# Patient Record
Sex: MALE | Race: WHITE
[De-identification: names, ages, dates, MRNs, and addresses within clinical notes are randomized per-mention and may not be internally consistent; named-entity substitution may affect disease eponyms.]

---

## 2017-01-01 ENCOUNTER — HOSPITAL ENCOUNTER (EMERGENCY)
Dept: HOSPITAL 17 - PHED | Age: 47
Discharge: HOME | End: 2017-01-01
Payer: COMMERCIAL

## 2017-01-01 VITALS — WEIGHT: 229.28 LBS | HEIGHT: 73 IN | BODY MASS INDEX: 30.39 KG/M2

## 2017-01-01 VITALS
RESPIRATION RATE: 18 BRPM | TEMPERATURE: 97.9 F | SYSTOLIC BLOOD PRESSURE: 119 MMHG | OXYGEN SATURATION: 96 % | HEART RATE: 112 BPM | DIASTOLIC BLOOD PRESSURE: 79 MMHG

## 2017-01-01 VITALS — OXYGEN SATURATION: 94 %

## 2017-01-01 VITALS
DIASTOLIC BLOOD PRESSURE: 86 MMHG | RESPIRATION RATE: 22 BRPM | OXYGEN SATURATION: 91 % | SYSTOLIC BLOOD PRESSURE: 119 MMHG | HEART RATE: 112 BPM | TEMPERATURE: 97.7 F

## 2017-01-01 VITALS — OXYGEN SATURATION: 92 %

## 2017-01-01 DIAGNOSIS — R00.0: ICD-10-CM

## 2017-01-01 DIAGNOSIS — F17.210: ICD-10-CM

## 2017-01-01 DIAGNOSIS — Z86.711: ICD-10-CM

## 2017-01-01 DIAGNOSIS — J44.1: Primary | ICD-10-CM

## 2017-01-01 LAB
ANION GAP SERPL CALC-SCNC: 10 MEQ/L (ref 5–15)
BASOPHILS # BLD AUTO: 0.6 TH/MM3 (ref 0–0.2)
BASOPHILS NFR BLD: 4.1 % (ref 0–2)
BUN SERPL-MCNC: 16 MG/DL (ref 7–18)
CHLORIDE SERPL-SCNC: 108 MEQ/L (ref 98–107)
EOSINOPHIL # BLD: 0.9 TH/MM3 (ref 0–0.4)
EOSINOPHIL NFR BLD: 6.2 % (ref 0–4)
ERYTHROCYTE [DISTWIDTH] IN BLOOD BY AUTOMATED COUNT: 15.2 % (ref 11.6–17.2)
GFR SERPLBLD BASED ON 1.73 SQ M-ARVRAT: 91 ML/MIN (ref 89–?)
HCO3 BLD-SCNC: 25 MEQ/L (ref 21–32)
HCT VFR BLD CALC: 42.6 % (ref 39–51)
HEMO FLAGS: (no result)
LYMPHOCYTES # BLD AUTO: 2.6 TH/MM3 (ref 1–4.8)
LYMPHOCYTES NFR BLD AUTO: 17.3 % (ref 9–44)
MCH RBC QN AUTO: 29.3 PG (ref 27–34)
MCHC RBC AUTO-ENTMCNC: 33.3 % (ref 32–36)
MCV RBC AUTO: 87.8 FL (ref 80–100)
MONOCYTES NFR BLD: 6.9 % (ref 0–8)
NEUTROPHILS # BLD AUTO: 10.1 TH/MM3 (ref 1.8–7.7)
NEUTROPHILS NFR BLD AUTO: 65.5 % (ref 16–70)
PLATELET # BLD: 450 TH/MM3 (ref 150–450)
POTASSIUM SERPL-SCNC: 4 MEQ/L (ref 3.5–5.1)
RBC # BLD AUTO: 4.85 MIL/MM3 (ref 4.5–5.9)
SODIUM SERPL-SCNC: 143 MEQ/L (ref 136–145)
WBC # BLD AUTO: 15.3 TH/MM3 (ref 4–11)

## 2017-01-01 PROCEDURE — 94664 DEMO&/EVAL PT USE INHALER: CPT

## 2017-01-01 PROCEDURE — 94640 AIRWAY INHALATION TREATMENT: CPT

## 2017-01-01 PROCEDURE — 71010: CPT

## 2017-01-01 PROCEDURE — 96374 THER/PROPH/DIAG INJ IV PUSH: CPT

## 2017-01-01 PROCEDURE — 99285 EMERGENCY DEPT VISIT HI MDM: CPT

## 2017-01-01 PROCEDURE — 84484 ASSAY OF TROPONIN QUANT: CPT

## 2017-01-01 PROCEDURE — 85025 COMPLETE CBC W/AUTO DIFF WBC: CPT

## 2017-01-01 PROCEDURE — 87804 INFLUENZA ASSAY W/OPTIC: CPT

## 2017-01-01 PROCEDURE — 80048 BASIC METABOLIC PNL TOTAL CA: CPT

## 2017-01-01 PROCEDURE — 93005 ELECTROCARDIOGRAM TRACING: CPT

## 2017-01-01 RX ADMIN — IPRATROPIUM BROMIDE AND ALBUTEROL SULFATE SCH AMPULE: .5; 3 SOLUTION RESPIRATORY (INHALATION) at 09:13

## 2017-01-01 NOTE — PD
HPI


Chief Complaint:  Respiratory Distress


Time Seen by Provider:  08:53


Travel History


International Travel<30 days:  No


Contact w/Intl Traveler<30days:  No


Traveled to known affect area:  No





History of Present Illness


HPI


This 46-year-old male is complaining of shortness of breath.  He says he been 

short of breath for the past week and is getting progressively worse.  He's 

been coughing up some yellow brownish phlegm.  He has not had fever.  He has a 

history of COPD.  He says he has not had a cigarette for the past week.  He has 

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  He has been on albuterol and Atrovent 

recently.  He has been on steroids in the past but is not on them currently.  

He has his first appointment with a pulmonologist on Friday PFSH


Past Medical History


Hx Anticoagulant Therapy:  Yes


Anxiety:  Yes


Heart Rhythm Problems:  Yes


Cancer:  No


Cardiovascular Problems:  Yes (CHRONIC TACHYCARDIA)


Congestive Heart Failure:  No


COPD:  Yes


Coronary Artery Disease:  No


Diabetes:  No


Diminished Hearing:  No


Diverticulitis:  Yes


Deep Vein Thrombosis:  Yes (PE)


Endocrine:  No


Gastrointestinal Disorders:  Yes


GERD:  Yes


Genitourinary:  No


Implanted Vascular Access Dvce:  Yes (FILTER)


Musculoskeletal:  No


Neurologic:  No


Psychiatric:  No


Respiratory:  Yes





Past Surgical History


Abdominal Surgery:  Yes (LIVER BIOPSY, COLOSTOMY)


Body Medical Devices:  UZIEL FILTER


Other Surgery:  Yes





Social History


Alcohol Use:  Yes (2-3 BEERS NIGHTLY)


Tobacco Use:  Yes (1/2PPD)


Substance Use:  No





Allergies-Medications


(Allergen,Severity, Reaction):  


Coded Allergies:  


     No Known Allergies (Unverified , 12/22/16)


Reported Meds & Prescriptions





Reported Meds & Active Scripts


Active


Albuterol Neb (Albuterol Sulfate) 2.5 Mg/3 Ml Neb 2.5 Mg NEB Q4HR NEB


     While awake


Reported


Advair Diskus Inh (Fluticasone-Salmeterol Inh) 100-50 Mcg/Blist Aer 1 Puff INH 

BID


     Rinse mouth after use.








Review of Systems


General / Constitutional:  No: Fever, Chills


Eyes:  No: Diploplia, Blurred Vision


HENT:  No: Headaches, Vertigo


Cardiovascular:  No: Chest Pain or Discomfort


Respiratory:  Positive: Cough, Shortness of Breath, Wheezing,  No: Hemoptysis


Gastrointestinal:  No: Vomiting, Diarrhea


Genitourinary:  No: Urgency, Frequency


Musculoskeletal:  No: Myalgias, Arthralgias


Skin:  No Rash, No Itching


Hematologic/Lymphatic:  No: Easy Bruising





Physical Exam


Narrative


GENERAL: Well-developed male.  He is dyspneic on arrival


SKIN: Warm and dry.


HEAD: Atraumatic. Normocephalic. 


EYES: Pupils equal and round. No scleral icterus. No injection or drainage. 


ENT: No nasal bleeding or discharge.  Mucous membranes pink and moist.


NECK: Trachea midline. No JVD. 


CARDIOVASCULAR: Regular rate and rhythm.  No murmur appreciated.


RESPIRATORY:  accessory muscle use.  Bilateral expiratory wheezes are present. 

Breath sounds equal bilaterally. 


GASTROINTESTINAL: Abdomen soft, non-tender, nondistended. Hepatic and splenic 

margins not palpable. 


MUSCULOSKELETAL: No obvious deformities. No clubbing.  No cyanosis.  No edema. 


NEUROLOGICAL: Awake and alert. No obvious cranial nerve deficits.  Motor 

grossly within normal limits. Normal speech.


PSYCHIATRIC: Appropriate mood and affect; insight and judgment normal.





Data


Data


Last Documented VS





Vital Signs








  Date Time  Temp Pulse Resp B/P Pulse Ox O2 Delivery O2 Flow Rate FiO2


 


1/1/17 09:45 97.9 112 18 119/79 96 Nasal Cannula 2 








Orders





 Complete Blood Count With Diff (1/1/17 08:53)


Basic Metabolic Panel (Bmp) (1/1/17 08:53)


Troponin I (1/1/17 08:53)


Influenzae A/B Antigen (1/1/17 08:53)


Iv Access Insert/Monitor (1/1/17 08:53)


Electrocardiogram (1/1/17 08:53)


Ecg Monitoring (1/1/17 08:53)


Oximetry (1/1/17 08:53)


Oxygen Administration (1/1/17 08:53)


Chest, Single Ap (1/1/17 08:53)


Sodium Chloride 0.9% Flush (Ns Flush) (1/1/17 09:00)


Methylprednisolone So Succ Inj (Solumedr (1/1/17 09:00)


Albuterol-Ipratropium Neb (Duoneb Neb) (1/1/17 09:00)





Labs








 Laboratory Tests








Test 1/1/17





 09:03


 


White Blood Count 15.3 TH/MM3


 


Red Blood Count 4.85 MIL/MM3


 


Hemoglobin 14.2 GM/DL


 


Hematocrit 42.6 %


 


Mean Corpuscular Volume 87.8 FL


 


Mean Corpuscular Hemoglobin 29.3 PG


 


Mean Corpuscular Hemoglobin 33.3 %





Concent 


 


Red Cell Distribution Width 15.2 %


 


Platelet Count 450 TH/MM3


 


Mean Platelet Volume 7.3 FL


 


Neutrophils (%) (Auto) 65.5 %


 


Lymphocytes (%) (Auto) 17.3 %


 


Monocytes (%) (Auto) 6.9 %


 


Eosinophils (%) (Auto) 6.2 %


 


Basophils (%) (Auto) 4.1 %


 


Neutrophils # (Auto) 10.1 TH/MM3


 


Lymphocytes # (Auto) 2.6 TH/MM3


 


Monocytes # (Auto) 1.1 TH/MM3


 


Eosinophils # (Auto) 0.9 TH/MM3


 


Basophils # (Auto) 0.6 TH/MM3


 


CBC Comment DIFF FINAL 


 


Differential Comment  


 


Sodium Level 143 MEQ/L


 


Potassium Level 4.0 MEQ/L


 


Chloride Level 108 MEQ/L


 


Carbon Dioxide Level 25.0 MEQ/L


 


Anion Gap 10 MEQ/L


 


Blood Urea Nitrogen 16 MG/DL


 


Creatinine 0.89 MG/DL


 


Estimat Glomerular Filtration 91 ML/MIN





Rate 


 


Random Glucose 87 MG/DL


 


Calcium Level 8.6 MG/DL


 


Troponin I LESS THAN 0.02





 NG/ML














[facility] [place]


Medical Decision Making


Medical Screen Exam Complete:  Yes


Emergency Medical Condition:  Yes


Medical Record Reviewed:  Yes


Differential Diagnosis


Differential includes pneumonia, COPD exacerbation, URI


Narrative Course


Chest x-rays been read as negative.  He has been given Solu-Medrol and repeated 

nebulizer treatments.  He reports feeling better.  Repeat examination shows 

improvement in his wheezing.  He appears stable for discharge.  He'll be 

released with prescriptions for prednisone, albuterol and amoxicillin





Diagnosis





 Primary Impression:  


 COPD with acute exacerbation


Scripts


Albuterol Neb 2.5 Mg/3 Ml Neb2.5 Mg NEB Q4HR NEB PRN (SHORTNESS OF BREATH) #60 

NEBULE  Ref 0


   Prov:Ryley Barcenas MD         1/1/17 


Amoxicillin 500 Mg Cjd406 Mg PO TID  #21 TAB  Ref 0


   Prov:Ryley Barcenas MD         1/1/17 


Prednisone 20 Mg Tab60 Mg PO DAILY  7 Days  Ref 0


   40 MG twice a day x 3 days, then 20 MG daily x 3 days, then 10 MG


   daily x 3 days


   Prov:Ryley Barcenas MD         1/1/17


Disposition:  01 DISCHARGE HOME


Condition:  Stable








Ryley Barcenas MD Jan 1, 2017 09:14

## 2017-01-01 NOTE — RADHPO
EXAM DATE/TIME:  01/01/2017 09:37 

 

[facility] COMPARISON:     

CHEST SINGLE AP, November 24, 2016, 12:15.

 

                     

INDICATIONS :     

Short of breath.

                     

 

MEDICAL HISTORY :     

Chronic obstructive pulmonary disease.  Deep venous thrombosis.        

 

SURGICAL HISTORY :     

IVC filter placement.   

 

ENCOUNTER:     

Subsequent                                        

 

ACUITY:     

1 day      

 

PAIN SCORE:     

1/10

 

LOCATION:     

Bilateral chest 

 

FINDINGS:     

A single view of the chest demonstrates the lungs to be symmetrically aerated without evidence of mas
s, infiltrate or effusion.  The cardiomediastinal contours are unremarkable.  Osseous structures are 
intact.

 

CONCLUSION:     No acute disease.  

 

 

 

 Cristo Alcazar MD on January 01, 2017 at 10:02           

Board Certified Radiologist.

 This report was verified electronically.

## 2017-01-02 NOTE — EKG
Date Performed: 01/01/2017       Time Performed: 08:47:00

 

PTAGE:      46 years

 

EKG:      Sinus tachycardia Septal T wave changes are nonspecific Since previous tracing, no signific
ant change noted Borderline ECG

 

PREVIOUS TRACING       : 12/08/2016 22.37

 

DOCTOR:   Gladis Gonzales  Interpretating Date/Time  01/02/2017 18:33:32

## 2017-02-27 ENCOUNTER — HOSPITAL ENCOUNTER (INPATIENT)
Dept: HOSPITAL 17 - PHED | Age: 47
LOS: 1 days | Discharge: HOME | DRG: 192 | End: 2017-02-28
Attending: HOSPITALIST | Admitting: HOSPITALIST
Payer: COMMERCIAL

## 2017-02-27 VITALS — BODY MASS INDEX: 31.23 KG/M2 | HEIGHT: 73 IN | WEIGHT: 235.67 LBS

## 2017-02-27 VITALS
RESPIRATION RATE: 20 BRPM | HEART RATE: 121 BPM | DIASTOLIC BLOOD PRESSURE: 79 MMHG | SYSTOLIC BLOOD PRESSURE: 115 MMHG | TEMPERATURE: 97.9 F | OXYGEN SATURATION: 92 %

## 2017-02-27 VITALS
DIASTOLIC BLOOD PRESSURE: 85 MMHG | OXYGEN SATURATION: 98 % | RESPIRATION RATE: 16 BRPM | HEART RATE: 114 BPM | TEMPERATURE: 97.7 F | SYSTOLIC BLOOD PRESSURE: 131 MMHG

## 2017-02-27 VITALS
HEART RATE: 122 BPM | RESPIRATION RATE: 26 BRPM | OXYGEN SATURATION: 88 % | TEMPERATURE: 97.7 F | DIASTOLIC BLOOD PRESSURE: 89 MMHG | SYSTOLIC BLOOD PRESSURE: 142 MMHG

## 2017-02-27 VITALS
HEART RATE: 106 BPM | RESPIRATION RATE: 18 BRPM | OXYGEN SATURATION: 99 % | TEMPERATURE: 97.7 F | DIASTOLIC BLOOD PRESSURE: 90 MMHG | SYSTOLIC BLOOD PRESSURE: 141 MMHG

## 2017-02-27 VITALS
HEART RATE: 77 BPM | SYSTOLIC BLOOD PRESSURE: 129 MMHG | DIASTOLIC BLOOD PRESSURE: 75 MMHG | RESPIRATION RATE: 19 BRPM | OXYGEN SATURATION: 95 % | TEMPERATURE: 97.9 F

## 2017-02-27 VITALS — OXYGEN SATURATION: 96 %

## 2017-02-27 VITALS
OXYGEN SATURATION: 98 % | SYSTOLIC BLOOD PRESSURE: 131 MMHG | TEMPERATURE: 97.7 F | HEART RATE: 114 BPM | DIASTOLIC BLOOD PRESSURE: 85 MMHG | RESPIRATION RATE: 16 BRPM

## 2017-02-27 VITALS — SYSTOLIC BLOOD PRESSURE: 131 MMHG | DIASTOLIC BLOOD PRESSURE: 74 MMHG

## 2017-02-27 VITALS — OXYGEN SATURATION: 95 %

## 2017-02-27 DIAGNOSIS — F41.9: ICD-10-CM

## 2017-02-27 DIAGNOSIS — F17.210: ICD-10-CM

## 2017-02-27 DIAGNOSIS — J44.1: Primary | ICD-10-CM

## 2017-02-27 LAB
ALP SERPL-CCNC: 112 U/L (ref 45–117)
ALT SERPL-CCNC: 143 U/L (ref 12–78)
ANION GAP SERPL CALC-SCNC: 6 MEQ/L (ref 5–15)
APTT BLD: 31 SEC (ref 24.3–30.1)
AST SERPL-CCNC: 90 U/L (ref 15–37)
BASE EXCESS BLD CALC-SCNC: 0.3 MMOL/L (ref -2–2)
BASOPHILS # BLD AUTO: 0.1 TH/MM3 (ref 0–0.2)
BASOPHILS NFR BLD: 0.8 % (ref 0–2)
BENZODIAZEPINES PNL UR: 88 % (ref 90–100)
BILIRUB SERPL-MCNC: 0.4 MG/DL (ref 0.2–1)
BLOOD GAS CARBOXYHEMOGLOBIN: 1.9 % (ref 0–4)
BLOOD GAS HCO3: 24 MMOL/L (ref 22–26)
BLOOD GAS OXYGEN CONTENT: 18.6 VOL % (ref 12–20)
BLOOD GAS PCO2: 38 MMHG (ref 38–42)
BUN SERPL-MCNC: 18 MG/DL (ref 7–18)
CHLORIDE SERPL-SCNC: 108 MEQ/L (ref 98–107)
CK MB SERPL-MCNC: 1.1 NG/ML (ref 0.5–3.6)
CK SERPL-CCNC: 111 U/L (ref 39–308)
CRITICAL VALUE: YES
DRAW SITE: (no result)
EOSINOPHIL # BLD: 0.8 TH/MM3 (ref 0–0.4)
EOSINOPHIL NFR BLD: 6.5 % (ref 0–4)
ERYTHROCYTE [DISTWIDTH] IN BLOOD BY AUTOMATED COUNT: 13.4 % (ref 11.6–17.2)
GFR SERPLBLD BASED ON 1.73 SQ M-ARVRAT: 108 ML/MIN (ref 89–?)
HCO3 BLD-SCNC: 26.7 MEQ/L (ref 21–32)
HCT VFR BLD CALC: 46 % (ref 39–51)
HEMO FLAGS: (no result)
INR PPP: 1 RATIO
LYMPHOCYTES # BLD AUTO: 2.1 TH/MM3 (ref 1–4.8)
LYMPHOCYTES NFR BLD AUTO: 16.9 % (ref 9–44)
MCH RBC QN AUTO: 28.5 PG (ref 27–34)
MCHC RBC AUTO-ENTMCNC: 32.5 % (ref 32–36)
MCV RBC AUTO: 87.7 FL (ref 80–100)
METHGB MFR BLDA: 1.1 % (ref 0–2)
MONOCYTES NFR BLD: 9.5 % (ref 0–8)
NEUTROPHILS # BLD AUTO: 8.2 TH/MM3 (ref 1.8–7.7)
NEUTROPHILS NFR BLD AUTO: 66.3 % (ref 16–70)
NUMBER OF ARTERIAL PUNCTURES: 2
O2/TOTAL GAS SETTING VFR VENT: 21 %
OXYGEN DEVICE: (no result)
PLATELET # BLD: 498 TH/MM3 (ref 150–450)
PO2 BLD: 59 MMHG (ref 61–120)
POTASSIUM SERPL-SCNC: 5 MEQ/L (ref 3.5–5.1)
PROTHROMBIN TIME: 10.7 SEC (ref 9.8–11.6)
RBC # BLD AUTO: 5.24 MIL/MM3 (ref 4.5–5.9)
SALICYLATES SERPL-MCNC: 15.1 G/DL (ref 12–16)
SODIUM SERPL-SCNC: 141 MEQ/L (ref 136–145)
STAT: YES
TEMP CORR TO: 98.6
WBC # BLD AUTO: 12.4 TH/MM3 (ref 4–11)

## 2017-02-27 PROCEDURE — 82552 ASSAY OF CPK IN BLOOD: CPT

## 2017-02-27 PROCEDURE — 94640 AIRWAY INHALATION TREATMENT: CPT

## 2017-02-27 PROCEDURE — 87804 INFLUENZA ASSAY W/OPTIC: CPT

## 2017-02-27 PROCEDURE — 36600 WITHDRAWAL OF ARTERIAL BLOOD: CPT

## 2017-02-27 PROCEDURE — 80053 COMPREHEN METABOLIC PANEL: CPT

## 2017-02-27 PROCEDURE — 96374 THER/PROPH/DIAG INJ IV PUSH: CPT

## 2017-02-27 PROCEDURE — 71010: CPT

## 2017-02-27 PROCEDURE — 82805 BLOOD GASES W/O2 SATURATION: CPT

## 2017-02-27 PROCEDURE — 85610 PROTHROMBIN TIME: CPT

## 2017-02-27 PROCEDURE — 94664 DEMO&/EVAL PT USE INHALER: CPT

## 2017-02-27 PROCEDURE — 85730 THROMBOPLASTIN TIME PARTIAL: CPT

## 2017-02-27 PROCEDURE — 83605 ASSAY OF LACTIC ACID: CPT

## 2017-02-27 PROCEDURE — 87040 BLOOD CULTURE FOR BACTERIA: CPT

## 2017-02-27 PROCEDURE — 84484 ASSAY OF TROPONIN QUANT: CPT

## 2017-02-27 PROCEDURE — 82550 ASSAY OF CK (CPK): CPT

## 2017-02-27 PROCEDURE — 85025 COMPLETE CBC W/AUTO DIFF WBC: CPT

## 2017-02-27 PROCEDURE — 93005 ELECTROCARDIOGRAM TRACING: CPT

## 2017-02-27 PROCEDURE — 96361 HYDRATE IV INFUSION ADD-ON: CPT

## 2017-02-27 RX ADMIN — IPRATROPIUM BROMIDE AND ALBUTEROL SULFATE SCH AMPULE: .5; 3 SOLUTION RESPIRATORY (INHALATION) at 19:46

## 2017-02-27 RX ADMIN — SODIUM CHLORIDE, PRESERVATIVE FREE SCH ML: 5 INJECTION INTRAVENOUS at 21:00

## 2017-02-27 RX ADMIN — IPRATROPIUM BROMIDE AND ALBUTEROL SULFATE SCH AMPULE: .5; 3 SOLUTION RESPIRATORY (INHALATION) at 13:30

## 2017-02-27 RX ADMIN — TIOTROPIUM BROMIDE SCH MCG: 18 CAPSULE ORAL; RESPIRATORY (INHALATION) at 17:16

## 2017-02-27 RX ADMIN — IPRATROPIUM BROMIDE AND ALBUTEROL SULFATE SCH AMPULE: .5; 3 SOLUTION RESPIRATORY (INHALATION) at 06:47

## 2017-02-27 NOTE — PD
Data


Data


Last Documented VS





Vital Signs








  Date Time  Temp Pulse Resp B/P Pulse Ox O2 Delivery O2 Flow Rate FiO2


 


2/27/17 07:31     95 Nasal Cannula 2 


 


2/27/17 07:08 97.7 114 16 131/85    








Orders





 Electrocardiogram (2/27/17 06:37)


Complete Blood Count With Diff (2/27/17 06:37)


Comprehensive Metabolic Panel (2/27/17 06:37)


Prothrombin Time / Inr (Pt) (2/27/17 06:37)


Act Partial Throm Time (Ptt) (2/27/17 06:37)


Lactic Acid Sepsis Protocol (2/27/17 06:37)


Ckmb (Isoenzyme) Profile (2/27/17 06:37)


Troponin I (2/27/17 06:37)


Blood Culture (2/27/17 06:37)


Chest, Single Ap (2/27/17 06:37)


Arterial Blood Gas (Abg) (2/27/17 06:37)


Blood Glucose (2/27/17 06:37)


Ecg Monitoring (2/27/17 06:37)


Iv Access Insert/Monitor (2/27/17 06:37)


Oximetry (2/27/17 06:37)


Oxygen Administration (2/27/17 06:37)


Methylprednisolone So Succ Inj (Solumedr (2/27/17 06:45)


Albuterol-Ipratropium Neb (Duoneb Neb) (2/27/17 06:45)


Influenzae A/B Antigen (2/27/17 06:37)


Sodium Chlor 0.9% 1000 Ml Inj (Ns 1000 M (2/27/17 06:45)


CKMB (2/27/17 07:00)


CKMB% (2/27/17 07:00)





Labs





 Laboratory Tests








Test 2/27/17 2/27/17 2/27/17





 06:55 06:59 07:00


 


Blood Gas Puncture Site RT BRACHIAL   


 


Blood Gas Patient Temperature 98.6   


 


Blood Gas HCO3 24 mmol/L  


 


Blood Gas Base Excess 0.3 mmol/L  


 


Blood Gas Oxygen Saturation 88 %  


 


Arterial Blood pH 7.42   


 


Arterial Blood Partial 38 mmHG  





Pressure CO2   


 


Arterial Blood Partial 59 mmHG  





Pressure O2   


 


Arterial Blood Oxygen Content 18.6 Vol %  


 


Arterial Blood 1.9 %  





Carboxyhemoglobin   


 


Arterial Blood Methemoglobin 1.1 %  


 


Blood Gas Hemoglobin 15.1 G/DL  


 


Oxygen Delivery Device ROOM AIR   


 


Blood Gas Inspired Oxygen 21 %  


 


Lactic Acid Level  1.3 mmol/L 


 


White Blood Count   12.4 TH/MM3


 


Red Blood Count   5.24 MIL/MM3


 


Hemoglobin   15.0 GM/DL


 


Hematocrit   46.0 %


 


Mean Corpuscular Volume   87.7 FL


 


Mean Corpuscular Hemoglobin   28.5 PG


 


Mean Corpuscular Hemoglobin   32.5 %





Concent   


 


Red Cell Distribution Width   13.4 %


 


Platelet Count   498 TH/MM3


 


Mean Platelet Volume   7.0 FL


 


Neutrophils (%) (Auto)   66.3 %


 


Lymphocytes (%) (Auto)   16.9 %


 


Monocytes (%) (Auto)   9.5 %


 


Eosinophils (%) (Auto)   6.5 %


 


Basophils (%) (Auto)   0.8 %


 


Neutrophils # (Auto)   8.2 TH/MM3


 


Lymphocytes # (Auto)   2.1 TH/MM3


 


Monocytes # (Auto)   1.2 TH/MM3


 


Eosinophils # (Auto)   0.8 TH/MM3


 


Basophils # (Auto)   0.1 TH/MM3


 


CBC Comment   DIFF FINAL 


 


Differential Comment    


 


Prothrombin Time   10.7 SEC


 


Prothromb Time International   1.0 RATIO





Ratio   


 


Activated Partial   31.0 SEC





Thromboplast Time   


 


Sodium Level   141 MEQ/L


 


Potassium Level   5.0 MEQ/L


 


Chloride Level   108 MEQ/L


 


Carbon Dioxide Level   26.7 MEQ/L


 


Anion Gap   6 MEQ/L


 


Blood Urea Nitrogen   18 MG/DL


 


Creatinine   0.77 MG/DL


 


Estimat Glomerular Filtration   108 ML/MIN





Rate   


 


Random Glucose   102 MG/DL


 


Calcium Level   8.4 MG/DL


 


Total Bilirubin   0.4 MG/DL


 


Aspartate Amino Transf   90 U/L





(AST/SGOT)   


 


Alanine Aminotransferase   143 U/L





(ALT/SGPT)   


 


Alkaline Phosphatase   112 U/L


 


Total Creatine Kinase   111 U/L


 


Creatine Kinase MB   1.1 NG/ML


 


Troponin I   LESS THAN 0.02





   NG/ML


 


Total Protein   7.5 GM/DL


 


Albumin   3.2 GM/DL











[facility]


Supervised Visit with ARTURO:  No


Narrative Course


This is a 46-year-old male who I am caring for subsequently after Dr. Turcios's 

assessment who presents to the emergency department with shortness of breath.  

The patient has a history of COPD.  He feels much better after serial 

bronchodilator treatments and steroids.  Labs are obtained which were 

reassuring and a chest x-ray was unremarkable.  Patient will be discharged with 

prednisone and antibiotic therapy.


Diagnosis





 Primary Impression:  


 COPD with acute exacerbation


Patient Instructions:  General Instructions





***Additional Instruction:


If you develop severe shortness of breath, chest pain, or difficulty breathing 

return to the emergency department.





Use albuterol every 4 hours for the next 2 days.  Then use as needed for 

wheezing.


Complete your course of steroids.


Complete your course of antibiotics.





Follow up with your primary care physician in 2-3 days if your symptoms have 

not improved.


***Med/Other Pt SpecificInfo:  Prescription(s) given


Scripts


Azithromycin 250 Mg Qbx380 Mg PO AS DIRECTED  #6 TAB


   Take 2 tabs (500 mg) on day 1 then 1 tab daily x 4 days.


   Prov:Shayla Steve MD         2/27/17 


Prednisone 20 Mg Tab40 Mg PO DAILY  4 Days


   Prov:Shayla Steve MD         2/27/17


Disposition:  01 DISCHARGE HOME


Condition:  Stable








Shayla Steve MD Feb 27, 2017 08:28

## 2017-02-27 NOTE — RADHPO
EXAM DATE/TIME:  02/27/2017 07:11 

 

[facility] COMPARISON:     

CT PULMONARY ANGIOGRAM, December 08, 2016, 23:22.  CHEST SINGLE AP, October 03, 2016, 3:08.  CHEST SI
NGLE AP, October 18, 2016, 19:34.  CHEST SINGLE AP, January 01, 2017, 9:37.

 

                     

INDICATIONS:     

Short of breath.

                     

 

MEDICAL HISTORY:     

Chronic obstructive pulmonary disease.          

 

SURGICAL HISTORY:     

None.   

 

ENCOUNTER:     

Initial                                        

 

ACUITY:     

2 days      

 

PAIN SCORE:     

0/10

 

LOCATION:     

Bilateral chest 

 

FINDINGS:     

Minimal bibasilar atelectasis and/or congestions is noted.  The heart is normal.  No focal alveolar c
onsolidation is noted. 

 

CONCLUSION:     

 

1.  Minimal bibasilar atelectasis and/or congestion.  Clinical correlation is recommended. 

 

 Zhen Felton MD on February 27, 2017 at 7:24                

Board Certified Radiologist.

 This report was verified electronically.

## 2017-02-27 NOTE — HHI.HP
__________________________________________________





[facility] [place]


Service


[facility]


Primary Care Physician


No Primary Care Physician


Admission Diagnosis


copd exacerbation, hypoxia


Diagnoses:  


Chief Complaint:  


Shortness of breath.


Travel History


International Travel<30 Days:  No


Contact w/Intl Traveler <30 Da:  No


Traveled to Known Affected Are:  No


History of Present Illness


This patient is a 46-year-old gentleman with previous diagnosis of COPD was 

evaluated in the emergency room for increased work of breathing and shortness 

of breath.  Patient was hypoxemic on arrival at 88%.  This improved with 

bronchodilators however the patient was not able to maintain his saturations.  

Patient upon ambulation continued to be hypoxemic.  He is recommended for 

further inpatient evaluation due to this.  Patient has no fevers or chills.  He 

does however admit a productive cough over the last several days.  He continues 

to smoke.  He did run out of his medications Spiriva and breo in the the last 2 

weeks.  He has tried using his nebulizer at home without relief.





Review of Systems


Constitutional:  DENIES: Diaphoretic episodes, Fatigue, Fever, Weight gain, 

Weight loss, Chills, Dizziness, Change in appetite, Night Sweats


Endocrine:  DENIES: Heat/cold intolerance, Polydipsia, Polyuria, Polyphagia


Eyes:  DENIES: Blurred vision, Diplopia, Eye inflammation, Eye pain, Vision loss

, Photosensitivity, Double Vision


Ears, nose, mouth, throat:  DENIES: Tinnitus, Hearing loss, Vertigo, Nasal 

discharge, Oral lesions, Throat pain, Hoarseness, Ear Pain, Running Nose, 

Epistaxis, Sinus Pain, Toothache, Odynophagia


Respiratory:  COMPLAINS OF: Wheezing, Shortness of breath,  DENIES: Apneas, 

Cough, Snoring, Hemoptysis, Sputum production


Cardiovascular:  COMPLAINS OF: Dyspnea on Exertion,  DENIES: Chest pain, 

Palpitations, Syncope, PND, Lower Extremity Edema, Orthopnea, Claudication


Gastrointestinal:  DENIES: Abdominal pain, Black stools, Bloody stools, 

Constipation, Diarrhea, Nausea, Vomiting, Difficulty Swallowing, Anorexia


Genitourinary:  DENIES: Sexual dysfunction, Urinary frequency, Urinary 

incontinence, Urgency, Hematuria, Dysuria, Nocturia, Penile Discharge, 

Testicular Pain, Testicular Swelling


Musculoskeletal:  DENIES: Joint pain, Muscle aches, Stiffness, Joint Swelling, 

Back pain, Neck pain


Integumentary:  DENIES: Abnormal pigmentation, Nail changes, Pruritus, Rash


Hematologic/lymphatic:  DENIES: Bruising, Lymphadenopathy


Immunologic/allergic:  DENIES: Eczema, Urticaria


Neurologic:  DENIES: Abnormal gait, Headache, Localized weakness, Paresthesias, 

Seizures, Speech Problems, Tremor, Poor Balance


Psychiatric:  DENIES: Anxiety, Confusion, Mood changes, Depression, 

Hallucinations, Agitation, Suicidal Ideation, Homicidal Ideation, Delusions





Past Family Social History


Past Medical History


COPD


Tobacco dependency 


diverticulitis


Pulmonary embolism


Past Surgical History


Liver biopsy, colostomy due to severe diverticular disease


Noah filter


Reported Medications


Reviewed in the medical record


Allergies:  


Coded Allergies:  


     No Known Allergies (Unverified , 2/27/17)


Active Ordered Medications


Reviewed in the medical record


Family History


Mother is healthy, father is not biological


Social History


No alcohol dependency, is employed, smokes at least 2 packs a day for the last 

35 years





Physical Exam


Vital Signs





 Vital Signs








  Date Time  Temp Pulse Resp B/P Pulse Ox O2 Delivery O2 Flow Rate FiO2


 


2/27/17 12:00 97.9 77 19 129/75 95   


 


2/27/17 09:40     95  2 


 


2/27/17 08:41  116 18 131/74 91   


 


2/27/17 07:31     95 Nasal Cannula 2 


 


2/27/17 07:30     98 Nasal Cannula 2 


 


2/27/17 07:08 97.7 114 16 131/85 98 Room Air  


 


2/27/17 07:07 97.7 114 16 131/85 98 Room Air  


 


2/27/17 07:06   16  98 Room Air  


 


2/27/17 06:46  121 24  91 Room Air  


 


2/27/17 06:32 97.7 122 26 142/89 88   








Physical Exam


GENERAL: This is a well-nourished, well-developed patient, in no apparent 

distress.


SKIN: No rashes, ecchymoses or lesions. Cool and dry.


HEAD: Atraumatic. Normocephalic. No temporal or scalp tenderness.


EYES: Pupils equal round and reactive. Extraocular motions intact. No scleral 

icterus. No injection or drainage. 


ENT: Nose without bleeding, purulent drainage or septal hematoma. Throat 

without erythema, tonsillar hypertrophy or exudate. Uvula midline. Airway 

patent.


NECK: Trachea midline. No JVD or lymphadenopathy. Supple, nontender, no 

meningeal signs.


CARDIOVASCULAR: Regular rate and rhythm without murmurs, gallops, or rubs. 


RESPIRATORY: Clear to auscultation. Breath sounds equal bilaterally. No wheezes

, rales, or rhonchi.  


GASTROINTESTINAL: Abdomen soft, non-tender, nondistended. No hepato-splenomegaly

, or palpable masses. No guarding.


MUSCULOSKELETAL: Extremities without clubbing, cyanosis, or edema. No joint 

tenderness, effusion, or edema noted. No calf tenderness. Negative Homans sign 

bilaterally.


NEUROLOGICAL: Awake and alert. Cranial nerves II through XII intact.  Motor and 

sensory grossly within normal limits. Five out of 5 muscle strength in all 

muscle groups.  Normal speech.


Laboratory





Laboratory Tests








Test 2/27/17 2/27/17 2/27/17





 06:55 06:59 07:00


 


Blood Gas Puncture Site RT BRACHIAL   


 


Blood Gas Patient Temperature 98.6   


 


Blood Gas HCO3 24   


 


Blood Gas Base Excess 0.3   


 


Blood Gas Oxygen Saturation 88   


 


Arterial Blood pH 7.42   


 


Arterial Blood Partial 38   





Pressure CO2   


 


Arterial Blood Partial 59   





Pressure O2   


 


Arterial Blood Oxygen Content 18.6   


 


Arterial Blood 1.9   





Carboxyhemoglobin   


 


Arterial Blood Methemoglobin 1.1   


 


Blood Gas Hemoglobin 15.1   


 


Oxygen Delivery Device ROOM AIR   


 


Blood Gas Inspired Oxygen 21   


 


Lactic Acid Level  1.3  


 


White Blood Count   12.4 


 


Red Blood Count   5.24 


 


Hemoglobin   15.0 


 


Hematocrit   46.0 


 


Mean Corpuscular Volume   87.7 


 


Mean Corpuscular Hemoglobin   28.5 


 


Mean Corpuscular Hemoglobin   32.5 





Concent   


 


Red Cell Distribution Width   13.4 


 


Platelet Count   498 


 


Mean Platelet Volume   7.0 


 


Neutrophils (%) (Auto)   66.3 


 


Lymphocytes (%) (Auto)   16.9 


 


Monocytes (%) (Auto)   9.5 


 


Eosinophils (%) (Auto)   6.5 


 


Basophils (%) (Auto)   0.8 


 


Neutrophils # (Auto)   8.2 


 


Lymphocytes # (Auto)   2.1 


 


Monocytes # (Auto)   1.2 


 


Eosinophils # (Auto)   0.8 


 


Basophils # (Auto)   0.1 


 


CBC Comment   DIFF FINAL 


 


Differential Comment    


 


Prothrombin Time   10.7 


 


Prothromb Time International   1.0 





Ratio   


 


Activated Partial   31.0 





Thromboplast Time   


 


Sodium Level   141 


 


Potassium Level   5.0 


 


Chloride Level   108 


 


Carbon Dioxide Level   26.7 


 


Anion Gap   6 


 


Blood Urea Nitrogen   18 


 


Creatinine   0.77 


 


Estimat Glomerular Filtration   108 





Rate   


 


Random Glucose   102 


 


Calcium Level   8.4 


 


Total Bilirubin   0.4 


 


Aspartate Amino Transf   90 





(AST/SGOT)   


 


Alanine Aminotransferase   143 





(ALT/SGPT)   


 


Alkaline Phosphatase   112 


 


Total Creatine Kinase   111 


 


Creatine Kinase MB   1.1 


 


Troponin I   LESS THAN 0.02 


 


Total Protein   7.5 


 


Albumin   3.2 














 Date/Time Procedure Status





Source Growth 


 


 2/27/17 07:16 Aerobic Blood Culture Received





Blood Peripheral Pending 





 2/27/17 07:16 Anaerobic Blood Culture Received





Blood Peripheral Pending 


 


 2/27/17 07:00 Influenza Types A,B Antigen (BRADY) - Final Complete





Nasal Washing NEGATIVE FOR FLU A AND B ANTIGEN.... 








Result Diagram:  


2/27/17 0700 2/27/17 0700





Imaging





Last Impressions








Chest X-Ray 2/27/17 0637 Signed





Impressions: 





 Service Date/Time:  Monday, February 27, 2017 07:11 - CONCLUSION:   1.  

Minimal 





 bibasilar atelectasis and/or congestion.  Clinical correlation is recommended.

   





  Zhen Felton MD 











Assessment and Plan


Problem List:  


(1) COPD with acute exacerbation


ICD Code:  J44.1


Status:  Acute


Plan:  Continue with nebulizer bronchodilators, IV steroids, pulmonary toilet


Patient is advised to discontinue tobacco


Follow for oxygenation as this patient was quite hypoxemic on arrival 88%





Assessment and Plan


Plan of care to determine by Hospital course


Code Status


Full code


Discussed Condition With


Patient, JENSEN GUIDRY





Physician Certification


2 Midnight Certification Type:  Admission for Inpatient Services


Order for Inpatient Services


The services are ordered in accordance with Medicare regulations or non-

Medicare payer requirements, as applicable.  In the case of services not 

specified as inpatient-only, they are appropriately provided as inpatient 

services in accordance with the 2-midnight benchmark.


Estimated LOS (days):  3


3 days is the estimated time the patient will need to remain in the hospital, 

assuming treatment plan goals are met and no additional complications.


Post-Hospital Plan:  Itzel Baker MD Feb 27, 2017 14:02

## 2017-02-27 NOTE — PD
HPI


Chief Complaint:  Respiratory Symptoms


Time Seen by Provider:  06:34


Travel History


International Travel<30 days:  No


Contact w/Intl Traveler<30days:  No


Traveled to known affect area:  No





History of Present Illness


HPI


46-year-old male with history of COPD here for evaluation of shortness of 

breath.  Patient started having shortest of breath yesterday, worse this 

morning.  Shortest of breath is at rest, worse with exertion.  He reports that 

he is out of his Spiriva.  He reports that he has been having a cough 

productive of greenish sputum.  No hemoptysis.  No fevers or chills.  No chest 

pain.  No known history of cardiac disease.  History of DVT during 

hospitalization several years ago, and had a [place] filter placed at that 

time.





PFSH


Past Medical History


Hx Anticoagulant Therapy:  Yes


Anxiety:  Yes


Heart Rhythm Problems:  Yes


Cancer:  No


Cardiovascular Problems:  Yes (CHRONIC TACHYCARDIA)


Congestive Heart Failure:  No


COPD:  Yes


Coronary Artery Disease:  No


Diabetes:  No


Diminished Hearing:  No


Diverticulitis:  Yes


Deep Vein Thrombosis:  Yes (PE)


Endocrine:  No


Gastrointestinal Disorders:  Yes


GERD:  Yes


Genitourinary:  No


Implanted Vascular Access Dvce:  Yes (FILTER)


Musculoskeletal:  No


Neurologic:  No


Psychiatric:  No


Respiratory:  Yes (COPD)





Past Surgical History


Abdominal Surgery:  Yes (LIVER BIOPSY, COLOSTOMY)


Body Medical Devices:  UZIEL FILTER


Other Surgery:  Yes





Social History


Alcohol Use:  Yes (2-3 BEERS NIGHTLY)


Tobacco Use:  Yes (1/2PPD)


Substance Use:  No





Allergies-Medications


(Allergen,Severity, Reaction):  


Coded Allergies:  


     No Known Allergies (Unverified , 2/27/17)


Reported Meds & Prescriptions





Reported Meds & Active Scripts


Active


Albuterol Neb (Albuterol Sulfate) 2.5 Mg/3 Ml Neb 2.5 Mg NEB Q4HR NEB


     While awake


Reported


Breo Ellipta Inh (Fluticasone/Vilanterol) 100-25 Mcg/Act Inh 1 Puff INH DAILY


     Use daily at the same time.


Spiriva Respimat Inh (Tiotropium Inh) 2.5 Mcg/Act Aero 2 Puff INH BID


     2.5 mcg = 1 inhalation


Spiriva Respimat Inh (Tiotropium Inh) 2.5 Mcg/Act Aero 2 Puff INH DAILY


     2.5 mcg = 1 inhalation








Review of Systems


Except as stated in HPI:  all other systems reviewed are Neg





Physical Exam


Narrative


GENERAL: Well-developed, well-nourished, moderate respiratory distress, 

speaking a few words at a time, pursed lip breathing


SKIN: Warm and dry.  No rash.


HEAD: Atraumatic. Normocephalic. 


EYES: Pupils equal and round. No scleral icterus. No injection or drainage. 


ENT: Mucous membranes pink and dry.


NECK: Trachea midline. No JVD. 


CARDIOVASCULAR: Tachycardic, rate 120, regular.


RESPIRATORY: Accessory muscle use.  Moderate respiratory distress.  Speaking a 

few words at a time.  Pursed lip breathing.  Expiratory wheezes bilaterally.  

Poor air movement bilaterally.


GASTROINTESTINAL: Abdomen soft, non-tender, nondistended.  Left colostomy with 

normal stool output.


MUSCULOSKELETAL: No obvious deformities. No clubbing.  No cyanosis.  No edema.  

No calf tenderness.


NEUROLOGICAL: Awake and alert. No obvious cranial nerve deficits.  Motor 

grossly within normal limits. Normal speech.


PSYCHIATRIC: Appropriate mood and affect; insight and judgment normal.





Data


Data


Last Documented VS





Vital Signs








  Date Time  Temp Pulse Resp B/P Pulse Ox O2 Delivery O2 Flow Rate FiO2


 


2/27/17 06:46  121 24  91 Room Air  


 


2/27/17 06:32 97.7   142/89    








Orders





 Electrocardiogram (2/27/17 06:37)


Complete Blood Count With Diff (2/27/17 06:37)


Comprehensive Metabolic Panel (2/27/17 06:37)


Prothrombin Time / Inr (Pt) (2/27/17 06:37)


Act Partial Throm Time (Ptt) (2/27/17 06:37)


Lactic Acid Sepsis Protocol (2/27/17 06:37)


Ckmb (Isoenzyme) Profile (2/27/17 06:37)


Troponin I (2/27/17 06:37)


Blood Culture (2/27/17 06:37)


Chest, Single Ap (2/27/17 06:37)


Arterial Blood Gas (Abg) (2/27/17 06:37)


Blood Glucose (2/27/17 06:37)


Ecg Monitoring (2/27/17 06:37)


Iv Access Insert/Monitor (2/27/17 06:37)


Oximetry (2/27/17 06:37)


Oxygen Administration (2/27/17 06:37)


Methylprednisolone So Succ Inj (Solumedr (2/27/17 06:45)


Albuterol-Ipratropium Neb (Duoneb Neb) (2/27/17 06:45)


Influenzae A/B Antigen (2/27/17 06:37)


Sodium Chlor 0.9% 1000 Ml Inj (Ns 1000 M (2/27/17 06:45)








MDM


Medical Decision Making


Medical Screen Exam Complete:  Yes


Emergency Medical Condition:  Yes


Medical Record Reviewed:  Yes


Differential Diagnosis


COPD exacerbation, sepsis, pneumonia, pneumothorax, PE, ACS


Narrative Course


At approximately 7:00 AM at the end of my shift the patient was signed out to 

Dr. Steve who will follow up with labs, imaging results, reassess the patient, 

and form a disposition.








Chris Turcios MD Feb 27, 2017 06:41

## 2017-02-27 NOTE — EKG
Date Performed: 02/27/2017       Time Performed: 07:17:06

 

PTAGE:      46 years

 

EKG:      Sinus tachycardia Normal ECG except for rate Compared to prior tracing no significant vianca butler 

 

 PREVIOUS TRACING            : 01/01/2017 08.47

 

DOCTOR:   Gilberto Schuler  Interpretating Date/Time  02/27/2017 13:37:23

## 2017-02-28 VITALS
SYSTOLIC BLOOD PRESSURE: 145 MMHG | TEMPERATURE: 96.2 F | RESPIRATION RATE: 16 BRPM | OXYGEN SATURATION: 94 % | HEART RATE: 114 BPM | DIASTOLIC BLOOD PRESSURE: 84 MMHG

## 2017-02-28 VITALS
OXYGEN SATURATION: 93 % | SYSTOLIC BLOOD PRESSURE: 121 MMHG | HEART RATE: 117 BPM | DIASTOLIC BLOOD PRESSURE: 77 MMHG | RESPIRATION RATE: 18 BRPM

## 2017-02-28 VITALS — OXYGEN SATURATION: 93 %

## 2017-02-28 RX ADMIN — IPRATROPIUM BROMIDE AND ALBUTEROL SULFATE SCH AMPULE: .5; 3 SOLUTION RESPIRATORY (INHALATION) at 07:33

## 2017-02-28 RX ADMIN — TIOTROPIUM BROMIDE SCH MCG: 18 CAPSULE ORAL; RESPIRATORY (INHALATION) at 09:14

## 2017-02-28 RX ADMIN — SODIUM CHLORIDE, PRESERVATIVE FREE SCH ML: 5 INJECTION INTRAVENOUS at 09:12

## 2017-02-28 NOTE — HHI.DCPOC
Discharge Care Plan


Diagnosis:  


(1) COPD with acute exacerbation


Goals to Promote Your Health


* To prevent worsening of your condition and complications


* To maintain your health at the optimal level


Directions to Meet Your Goals


*** Take your medications as prescribed


*** Follow your dietary instruction


*** Follow activity as directed








*** Keep your appointments as scheduled


*** Take your immunizations and boosters as scheduled


*** If your symptoms worsen call your PCP, if no PCP go to Urgent Care Center 

or Emergency Room***


*** Smoking is Dangerous to Your Health. Avoid second hand smoke***


***Call the 24-hour hour crisis hotline for domestic abuse at 1-704.891.2165***








Itzel Carrizales MD Feb 28, 2017 10:13

## 2017-02-28 NOTE — HHI.DS
__________________________________________________





Discharge Summary


Admission Date


Feb 27, 2017 at 08:59


Discharge Date:  Feb 28, 2017


Admitting Diagnosis


copd exacerbation, hypoxia





(1) COPD with acute exacerbation


ICD Code:  J44.1


Diagnosis:  Principal





Procedures


none


Brief History - From Admission


This patient is a 46-year-old gentleman with previous diagnosis of COPD was 

evaluated in the emergency room for increased work of breathing and shortness 

of breath.  Patient was hypoxemic on arrival at 88%.  This improved with 

bronchodilators however the patient was not able to maintain his saturations.  

Patient upon ambulation continued to be hypoxemic.  He is recommended for 

further inpatient evaluation due to this.  Patient has no fevers or chills.  He 

does however admit a productive cough over the last several days.  He continues 

to smoke.  He did run out of his medications Spiriva and breo in the the last 2 

weeks.  He has tried using his nebulizer at home without relief.


CBC/BMP:  


2/27/17 0700                                                                   

             2/27/17 0700





Significant Findings





Laboratory Tests








Test 2/27/17 2/27/17





 06:55 07:00


 


Blood Gas Oxygen Saturation 88 % () 


 


Arterial Blood Partial 59 mmHG 





Pressure O2 () 


 


White Blood Count  12.4 TH/MM3





  (4.0-11.0)


 


Platelet Count  498 TH/MM3





  (150-450)


 


Monocytes (%) (Auto)  9.5 % (0.0-8.0)


 


Eosinophils (%) (Auto)  6.5 % (0.0-4.0)


 


Neutrophils # (Auto)  8.2 TH/MM3





  (1.8-7.7)


 


Monocytes # (Auto)  1.2 TH/MM3





  (0-0.9)


 


Eosinophils # (Auto)  0.8 TH/MM3





  (0-0.4)


 


Activated Partial  31.0 SEC





Thromboplast Time  (24.3-30.1)


 


Chloride Level  108 MEQ/L





  ()


 


Calcium Level  8.4 MG/DL





  (8.5-10.1)


 


Aspartate Amino Transf  90 U/L (15-37)





(AST/SGOT)  


 


Alanine Aminotransferase  143 U/L (12-78)





(ALT/SGPT)  


 


Troponin I  LESS THAN 0.02





  NG/ML





  (0.02-0.05)


 


Albumin  3.2 GM/DL





  (3.4-5.0)








Imaging





Last Impressions








Chest X-Ray 2/27/17 0637 Signed





Impressions: 





 Service Date/Time:  Monday, February 27, 2017 07:11 - CONCLUSION:   1.  

Minimal 





 bibasilar atelectasis and/or congestion.  Clinical correlation is recommended.

   





  Zhen Felton MD 








PE at Discharge


GENERAL: This is a well-nourished, well-developed patient, in no apparent 

distress.


CARDIOVASCULAR: Regular rate and rhythm without murmurs, gallops, or rubs. 


RESPIRATORY: Clear to auscultation. Breath sounds equal bilaterally. No wheezes

, rales, or rhonchi.  


GASTROINTESTINAL: Abdomen soft, non-tender, nondistended. Normal active bowel 

sounds


MUSCULOSKELETAL: Extremities without clubbing, cyanosis, or edema.


NEURO:  Alert & Oriented x4 to person, place, time, situation.  Moves all ext x4


Pt update on day of discharge


Patient seen today in follow-up for hypoxemia secondary to COPD exacerbation.  

Patient is practicing improved and no longer hypoxemic.  Patient education was 

provided on continuing these medications.  Prescription refills are provided.


Hospital Course


Patient is a 46-year-old gentleman with a history of COPD who came in 

hypoxemic.  The patient had run out of his medications and required quite a bit 

of nebulizers and steroids while he was here.  Patient did dramatically improve 

overnight and was restarted on his home medications.


Pt Condition on Discharge:  Good


Discharge Disposition:  Discharge Home


Discharge Time:  > 30 minutes


Discharge Instructions


DIET: Follow Instructions for:  As Tolerated, No Restrictions


Activities you can perform:  Regular-No Restrictions


New Medications:  


Prednisone (21) 5 mg tab Dose Pack (Prednisone (21) 5 mg tab Dose Pack) 5 Mg 

Dspk


5 MG PO AS DIRECTED Inflammation #1 Ref 0 DSPK


 


Continued Medications:  


Albuterol Neb (Albuterol Neb) 2.5 Mg/3 Ml Neb


2.5 MG NEB Q4HR NEB While awake Breathing Treatment #60 Ref 0 NEBULE (This 

prescription has been renewed)


Fluticasone-Vilanterol Inh (Breo Ellipta Inh) 100-25 Mcg/Act Inh


1 PUFF INH DAILY Use daily at the same time. copd #1 Ref 3 INHALER (This 

prescription has been renewed)


Tiotropium Inh (Spiriva Respimat Inh) 2.5 Mcg/Act Aero


2 PUFF INH DAILY 2.5 mcg = 1 inhalation COPD #1 Ref 3 INHALER (This 

prescription has been renewed)











Itzel Carrizales MD Feb 28, 2017 10:15

## 2017-03-22 ENCOUNTER — HOSPITAL ENCOUNTER (EMERGENCY)
Dept: HOSPITAL 17 - PHED | Age: 47
LOS: 1 days | Discharge: LEFT BEFORE DISCHARGE | End: 2017-03-23
Payer: COMMERCIAL

## 2017-03-22 VITALS
TEMPERATURE: 100.2 F | DIASTOLIC BLOOD PRESSURE: 92 MMHG | HEART RATE: 118 BPM | RESPIRATION RATE: 22 BRPM | OXYGEN SATURATION: 93 % | SYSTOLIC BLOOD PRESSURE: 137 MMHG

## 2017-03-22 VITALS — HEIGHT: 73 IN | BODY MASS INDEX: 29.22 KG/M2 | WEIGHT: 220.46 LBS

## 2017-03-22 VITALS — TEMPERATURE: 98.7 F | RESPIRATION RATE: 22 BRPM | HEART RATE: 106 BPM | OXYGEN SATURATION: 93 %

## 2017-03-22 VITALS
RESPIRATION RATE: 20 BRPM | HEART RATE: 114 BPM | SYSTOLIC BLOOD PRESSURE: 146 MMHG | DIASTOLIC BLOOD PRESSURE: 85 MMHG | OXYGEN SATURATION: 95 %

## 2017-03-22 VITALS — HEART RATE: 106 BPM | DIASTOLIC BLOOD PRESSURE: 89 MMHG | SYSTOLIC BLOOD PRESSURE: 155 MMHG | OXYGEN SATURATION: 95 %

## 2017-03-22 VITALS
OXYGEN SATURATION: 96 % | DIASTOLIC BLOOD PRESSURE: 81 MMHG | SYSTOLIC BLOOD PRESSURE: 141 MMHG | RESPIRATION RATE: 20 BRPM | HEART RATE: 113 BPM

## 2017-03-22 VITALS — TEMPERATURE: 98.7 F

## 2017-03-22 DIAGNOSIS — K21.9: ICD-10-CM

## 2017-03-22 DIAGNOSIS — R00.0: ICD-10-CM

## 2017-03-22 DIAGNOSIS — Z86.711: ICD-10-CM

## 2017-03-22 DIAGNOSIS — F17.210: ICD-10-CM

## 2017-03-22 DIAGNOSIS — J44.9: ICD-10-CM

## 2017-03-22 DIAGNOSIS — Z79.01: ICD-10-CM

## 2017-03-22 DIAGNOSIS — Z86.718: ICD-10-CM

## 2017-03-22 DIAGNOSIS — A41.9: ICD-10-CM

## 2017-03-22 DIAGNOSIS — Z96.89: ICD-10-CM

## 2017-03-22 DIAGNOSIS — J18.9: Primary | ICD-10-CM

## 2017-03-22 LAB
ANION GAP SERPL CALC-SCNC: 8 MEQ/L (ref 5–15)
APTT BLD: 31.1 SEC (ref 24.3–30.1)
BASOPHILS # BLD AUTO: 0.1 TH/MM3 (ref 0–0.2)
BASOPHILS NFR BLD: 0.8 % (ref 0–2)
BUN SERPL-MCNC: 15 MG/DL (ref 7–18)
CHLORIDE SERPL-SCNC: 106 MEQ/L (ref 98–107)
EOSINOPHIL # BLD: 1.4 TH/MM3 (ref 0–0.4)
EOSINOPHIL NFR BLD: 11 % (ref 0–4)
ERYTHROCYTE [DISTWIDTH] IN BLOOD BY AUTOMATED COUNT: 13.2 % (ref 11.6–17.2)
GFR SERPLBLD BASED ON 1.73 SQ M-ARVRAT: 92 ML/MIN (ref 89–?)
HCO3 BLD-SCNC: 28.8 MEQ/L (ref 21–32)
HCT VFR BLD CALC: 43.3 % (ref 39–51)
HEMO FLAGS: (no result)
INR PPP: 1 RATIO
LYMPHOCYTES # BLD AUTO: 1.8 TH/MM3 (ref 1–4.8)
LYMPHOCYTES NFR BLD AUTO: 14.1 % (ref 9–44)
MAGNESIUM SERPL-MCNC: 1.9 MG/DL (ref 1.5–2.5)
MCH RBC QN AUTO: 29.6 PG (ref 27–34)
MCHC RBC AUTO-ENTMCNC: 34.2 % (ref 32–36)
MCV RBC AUTO: 86.6 FL (ref 80–100)
MONOCYTES NFR BLD: 8.5 % (ref 0–8)
NEUTROPHILS # BLD AUTO: 8.7 TH/MM3 (ref 1.8–7.7)
NEUTROPHILS NFR BLD AUTO: 65.6 % (ref 16–70)
PLATELET # BLD: 462 TH/MM3 (ref 150–450)
POTASSIUM SERPL-SCNC: 3.7 MEQ/L (ref 3.5–5.1)
PROTHROMBIN TIME: 10.7 SEC (ref 9.8–11.6)
RBC # BLD AUTO: 5 MIL/MM3 (ref 4.5–5.9)
SODIUM SERPL-SCNC: 143 MEQ/L (ref 136–145)
WBC # BLD AUTO: 13.1 TH/MM3 (ref 4–11)

## 2017-03-22 PROCEDURE — 96375 TX/PRO/DX INJ NEW DRUG ADDON: CPT

## 2017-03-22 PROCEDURE — C9113 INJ PANTOPRAZOLE SODIUM, VIA: HCPCS

## 2017-03-22 PROCEDURE — 84484 ASSAY OF TROPONIN QUANT: CPT

## 2017-03-22 PROCEDURE — 85025 COMPLETE CBC W/AUTO DIFF WBC: CPT

## 2017-03-22 PROCEDURE — 87040 BLOOD CULTURE FOR BACTERIA: CPT

## 2017-03-22 PROCEDURE — 85730 THROMBOPLASTIN TIME PARTIAL: CPT

## 2017-03-22 PROCEDURE — 83605 ASSAY OF LACTIC ACID: CPT

## 2017-03-22 PROCEDURE — 96365 THER/PROPH/DIAG IV INF INIT: CPT

## 2017-03-22 PROCEDURE — 99291 CRITICAL CARE FIRST HOUR: CPT

## 2017-03-22 PROCEDURE — 93005 ELECTROCARDIOGRAM TRACING: CPT

## 2017-03-22 PROCEDURE — 85379 FIBRIN DEGRADATION QUANT: CPT

## 2017-03-22 PROCEDURE — 94664 DEMO&/EVAL PT USE INHALER: CPT

## 2017-03-22 PROCEDURE — 71275 CT ANGIOGRAPHY CHEST: CPT

## 2017-03-22 PROCEDURE — 71010: CPT

## 2017-03-22 PROCEDURE — 96361 HYDRATE IV INFUSION ADD-ON: CPT

## 2017-03-22 PROCEDURE — 83735 ASSAY OF MAGNESIUM: CPT

## 2017-03-22 PROCEDURE — 80048 BASIC METABOLIC PNL TOTAL CA: CPT

## 2017-03-22 PROCEDURE — 85610 PROTHROMBIN TIME: CPT

## 2017-03-22 NOTE — PD
HPI


Chief Complaint:  Chest Pain


Time Seen by Provider:  21:14


Travel History


International Travel<30 days:  No


Contact w/Intl Traveler<30days:  No


Traveled to known affect area:  No





History of Present Illness


HPI


46-year-old male presents to the emergency department by private transportation 

for complaint of increased heart rate shortness of breath and right sided upper 

chest pain with movement and with breathing.  Patient states symptoms have been 

present since yesterday and progressive worsening.  Patient denies any injury 

or fall.  Patient denies any referred pain.  Patient does have COPD.  Patient 

denies any retrosternal left-sided chest pain.  Patient denies any referred 

neck jaw back shoulder or arm pain.  Patient denies any right upper extremity 

associated pain swelling or paresthesias.  Patient's had no recent febrile 

illness.  Patient has had personal history of DVT summer 2016, had a "filter" 

placed, and was taken off of Xarelto "a few months ago".  No report of long 

distance travel surgical procedure or protracted illness/bedrest.





PFSH


Past Medical History


*** Narrative Medical


DVT, COPD, chronic tachycardia, diverticulitis with perforation, partial 

colectomy, colostomy, tobacco use marijuana use; nursing notes reviewed


Hx Anticoagulant Therapy:  Yes


Anxiety:  Yes


Heart Rhythm Problems:  Yes


Cancer:  No


Cardiovascular Problems:  Yes (CHRONIC TACHYCARDIA)


Congestive Heart Failure:  No


COPD:  Yes


Coronary Artery Disease:  No


Diabetes:  No


Diminished Hearing:  No


Diverticulitis:  Yes


Deep Vein Thrombosis:  Yes (PE-FILTER UPPER LEG )


Endocrine:  No


Gastrointestinal Disorders:  Yes


GERD:  Yes


Genitourinary:  No


Implanted Vascular Access Dvce:  Yes (FILTER)


Musculoskeletal:  No


Neurologic:  No


Psychiatric:  No


Respiratory:  Yes (COPD)





Past Surgical History


Abdominal Surgery:  Yes (LIVER BIOPSY, COLOSTOMY)


Body Medical Devices:  UZIEL FILTER


Other Surgery:  Yes





Social History


Alcohol Use:  Yes (OCCASIONAL ON WEEEKND )


Tobacco Use:  Yes (2 PACKS A WEEK )


Substance Use:  No





Allergies-Medications


(Allergen,Severity, Reaction):  


Coded Allergies:  


     No Known Allergies (Unverified , 3/22/17)


Reported Meds & Prescriptions





Reported Meds & Active Scripts


Active


Breo Ellipta Inh (Fluticasone/Vilanterol) 100-25 Mcg/Act Inh 1 Puff INH DAILY


     Use daily at the same time.


Spiriva Respimat Inh (Tiotropium Inh) 2.5 Mcg/Act Aero 2 Puff INH DAILY


     2.5 mcg = 1 inhalation


Albuterol Neb (Albuterol Sulfate) 2.5 Mg/3 Ml Neb 2.5 Mg NEB Q4HR NEB


     While awake








Review of Systems


Except as stated in HPI:  all other systems reviewed are Neg


General / Constitutional:  No: Fever, Chills


HENT:  No: Congestion


Cardiovascular:  Positive: Chest Pain or Discomfort


Respiratory:  Positive: Shortness of Breath


Gastrointestinal:  No: Abdominal Pain


Genitourinary:  No: Dysuria


Musculoskeletal:  No: Myalgias


Skin:  No Rash


Psychiatric:  Positive: Anxiety


Endocrine:  No: Heat Intolerance


Hematologic/Lymphatic:  No: Easy Bruising





Physical Exam


Narrative


GENERAL: Well-developed well-nourished male in mild respiratory distress with 

room air O2 saturation 92% no accessory muscle use and monitored sinus 

tachycardia rate 117.


SKIN: Warm and dry.


HEAD: Normocephalic.


EYES: No scleral icterus. No injection or drainage. 


NECK: Supple, trachea midline. No JVD or lymphadenopathy.


CARDIOVASCULAR: Increased Regular rate and rhythm without murmurs, gallops, or 

rubs. 


RESPIRATORY: Breath sounds equal bilaterally diminished in all fields. No 

accessory muscle use.


GASTROINTESTINAL: Abdomen soft, non-tender, nondistended. 


MUSCULOSKELETAL: No cyanosis, or edema.  Radial and dorsalis pedis pulses 2+ to 

palpation.


BACK: Nontender without obvious deformity. No CVA tenderness.








Data


Data


Last Documented VS





Vital Signs








  Date Time  Temp Pulse Resp B/P Pulse Ox O2 Delivery O2 Flow Rate FiO2


 


3/22/17 23:59 98.7 106 22  93 Room Air  


 


3/22/17 23:05    155/89   1 








Orders





 Chest, Single Ap (3/22/17 )


Complete Blood Count With Diff (3/22/17 21:14)


Basic Metabolic Panel (Bmp) (3/22/17 21:14)


D-Dimer (3/22/17 21:14)


Act Partial Throm Time (Ptt) (3/22/17 21:14)


Prothrombin Time / Inr (Pt) (3/22/17 21:14)


Magnesium (Mg) (3/22/17 21:14)


Troponin I (3/22/17 21:14)


Iv Access Insert/Monitor (3/22/17 21:14)


Electrocardiogram (3/22/17 21:14)


Ecg Monitoring (3/22/17 21:14)


Oximetry (3/22/17 21:14)


Oxygen Administration (3/22/17 21:14)


Sodium Chloride 0.9% Flush (Ns Flush) (3/22/17 21:15)


Sodium Chlor 0.9% 1000 Ml Inj (Ns 1000 M (3/22/17 21:15)


Blood Culture (3/22/17 21:28)


Lactic Acid Sepsis Protocol (3/22/17 21:28)


Ceftriaxone Inj (Rocephin Inj) (3/22/17 21:30)


Acetaminophen (Tylenol) (3/22/17 21:30)


Methylprednisolone So Succ Inj (Solumedr (3/22/17 21:45)


Albuterol-Ipratropium Neb (Duoneb Neb) (3/22/17 21:45)


Pantoprazole Inj (Protonix Inj) (3/22/17 21:45)


Morphine Inj (Morphine Inj) (3/22/17 21:45)


Ondansetron Inj (Zofran Inj) (3/22/17 21:45)


Ct Pulmonary Angiogram (3/22/17 )


Sodium Chlorid 0.9% 500 Ml Inj (Ns 500 M (3/22/17 22:45)


Iohexol 350 Inj (Omnipaque 350 Inj) (3/22/17 23:07)


Levofloxacin (Levaquin) (3/23/17 00:00)





Labs





 Laboratory Tests








Test 3/22/17 3/22/17





 21:05 21:40


 


White Blood Count 13.1 TH/MM3 


 


Red Blood Count 5.00 MIL/MM3 


 


Hemoglobin 14.8 GM/DL 


 


Hematocrit 43.3 % 


 


Mean Corpuscular Volume 86.6 FL 


 


Mean Corpuscular Hemoglobin 29.6 PG 


 


Mean Corpuscular Hemoglobin 34.2 % 





Concent  


 


Red Cell Distribution Width 13.2 % 


 


Platelet Count 462 TH/MM3 


 


Mean Platelet Volume 7.6 FL 


 


Neutrophils (%) (Auto) 65.6 % 


 


Lymphocytes (%) (Auto) 14.1 % 


 


Monocytes (%) (Auto) 8.5 % 


 


Eosinophils (%) (Auto) 11.0 % 


 


Basophils (%) (Auto) 0.8 % 


 


Neutrophils # (Auto) 8.7 TH/MM3 


 


Lymphocytes # (Auto) 1.8 TH/MM3 


 


Monocytes # (Auto) 1.1 TH/MM3 


 


Eosinophils # (Auto) 1.4 TH/MM3 


 


Basophils # (Auto) 0.1 TH/MM3 


 


CBC Comment DIFF FINAL  


 


Differential Comment   


 


Prothrombin Time 10.7 SEC 


 


Prothromb Time International 1.0 RATIO 





Ratio  


 


Activated Partial 31.1 SEC 





Thromboplast Time  


 


D-Dimer Quantitative (PE/DVT) 0.87 MG/L FEU 


 


Sodium Level 143 MEQ/L 


 


Potassium Level 3.7 MEQ/L 


 


Chloride Level 106 MEQ/L 


 


Carbon Dioxide Level 28.8 MEQ/L 


 


Anion Gap 8 MEQ/L 


 


Blood Urea Nitrogen 15 MG/DL 


 


Creatinine 0.88 MG/DL 


 


Estimat Glomerular Filtration 92 ML/MIN 





Rate  


 


Random Glucose 88 MG/DL 


 


Calcium Level 9.0 MG/DL 


 


Magnesium Level 1.9 MG/DL 


 


Troponin I LESS THAN 0.02 





 NG/ML 


 


Lactic Acid Level  0.9 mmol/L











[facility]


Medical Decision Making


Medical Screen Exam Complete:  Yes


Emergency Medical Condition:  Yes


Medical Record Reviewed:  Yes


Interpretation(s)


lactic acid: 0.9, not elevated


troponin I: Less than 0.02


Coagulation studies: Within normal range; d-dimer mildly elevated at 0.87


Last Impressions








Chest X-Ray 3/22/17 0000 Signed





Impressions: 





 Service Date/Time:  Wednesday, March 22, 2017 21:15 - CONCLUSION: No acute 





 cardiopulmonary disease.    RANDOLPH Miles MD 


 


CT Angiography 3/22/17 0000 Signed





Impressions: 





 Service Date/Time:  Wednesday, March 22, 2017 22:48 - CONCLUSION:  1. 

Pneumonia 





 with patchy bilateral infiltrates seen. Small right effusion. 2. No evidence 

of 





 pulmonary embolus.     Cristo Alcazar MD 





CBC & BMP Diagram


3/22/17 21:05











 Vital Signs








  Date Time  Temp Pulse Resp B/P Pulse Ox O2 Delivery O2 Flow Rate FiO2


 


3/22/17 23:05  106  155/89 95  1 


 


3/22/17 22:52 98.7       


 


3/22/17 22:03  114 20 146/85 95 Nasal Cannula 2 


 


3/22/17 22:00      Aerosol Mask  


 


3/22/17 21:32  113 20 141/81 96 Nasal Cannula 2 


 


3/22/17 21:13      Nasal Cannula 2 


 


3/22/17 21:11 100.2 118 22 137/92 93   


 


3/22/17 21:10     93 Room Air  








Differential Diagnosis


Pleurisy, PE, pneumothorax, pneumonia, exacerbation COPD, ACS, musculoskeletal 

pain, atypical biliary colic


Narrative Course


Patient placed on cardiac monitor IV access obtained specimens collected and 

sent for resulting patient placed on supple oxygen 2 L/m nasal cannula EKG 

shows sinus tachycardia no acute ST elevation or injury pattern or ectopy noted

; normal saline administered@125 cc per hour; stat chest x-ray ordered


Chest x-ray reveals no pneumothorax, no lobar infiltrate, no effusion; patient 

treated with presumptive Rocephin 1 g IV piggyback; d-dimer pending however in 

view of patient's previous PE and DVT may require CT pulmonary angiogram.  

Patient reportedly does have a [place] filter.


Patient with elevated d-dimer sent for CT pulmonary angiogram


CT reveals no evidence of PE but bilateral lower lobe infiltrates consistent 

with pneumonia patient is artery received Rocephin 1 g IV piggyback plan to 

administer azithromycin however have discussed this with patient and he states 

he cannot be admitted patient will be given oral dose of Levaquin due to same 

bioavailability IV versus by mouth.  Patient is aware that we will take him off 

supplemental oxygen as he does not use supplemental oxygen at home and that his 

oxygen saturations decrease he will have to sign out AGAINST MEDICAL ADVICE.  

Patient is aware of the risks and benefits of staying for IV antibiotics IV 

fluids supplemental oxygen serial updraft treatments and rest in view of his 

history of COPD with exacerbation and bilateral lower lobe infiltrates 

consistent with pneumonia with white cell count elevation and presentation with 

increased heart rate and increased respiratory rate and white count of 13,000.  

Patient states that he is aware of the benefit of staying in the hospital and 

the risk of deteriorating as an outpatient but states that he will not be 

admitted as he feels well at this time he has albuterol with a nebulizer at 

home he will take oral antibiotic as prescribed he will also take medications 

as needed for fever and he will return to the hospital immediately should he 

did not show any evidence of improvement or should he feel like she is getting 

worse but as he has not been on any antibiotics as an outpatient he would like 

to have a trial as an outpatient before being admitted to the hospital.  

Patient states he started new job and that he cannot be admitted at this time.


Patient without supple oxygen resources at home and desaturates to 86% with 

exertion; patient informed that he needs to be admitted to the hospital; again 

states he cannot be admitted will sign out AGAINST MEDICAL ADVICE; in view of 

patient's pneumonia diagnosis he will be discharged with prescription and he is 

encouraged to return for admission.


Critical Care Narrative


Aggregate critical care time was 40 minutes. Time to perform other separately 

billable procedures was not included in the critical care time. My time did not 

include minutes spent treating any other patients simultaneously or on 

activities that did not directly contribute to the patient's treatment.  





The services I provided to this patient were to treat and/or prevent clinically 

significant deterioration that could result in: Respiratory failure, septic 

shock, death





I provided critical care services requiring my management, as noted below:


Chart data review, documentation time, medication orders and management, vital 

sign assessments/reviewing monitor data, ordering and reviewing lab tests, 

ordering and interpreting/reviewing x-rays and diagnostic studies, care of the 

patient and discussion of the patient with the admitting physicians.





Sepsis Criteria


SIRS Criteria (2 or more):  Heart rate over 90, RR  > 20 or PaCO2 < 32, WBC > 

06226, < 4000 or > 10% bands


Sepsis Criteria (SIRS+source):  Infect source susp/known





Diagnosis





 Primary Impression:  


 Pneumonia


 Qualified Code:  J18.9 - Pneumonia of both lower lobes due to infectious 

organism


 Additional Impressions:  


 Sepsis


 Qualified Code:  A41.9 - Sepsis, due to unspecified organism


 COPD (chronic obstructive pulmonary disease)


Referrals:  


Primary Care Physician


1 day


Patient Instructions:  General Instructions





***Additional Instructions:


Complete course of antibiotic as prescribed


Follow-up with primary care provider call office in a.m. to schedule follow-up 

appointment


Take acetaminophen/Tylenol every 4 hours as needed for fever 100.4F or greater


Take as tolerated ibuprofen/Advil/Motrin every 6-8 hours as needed for fever 

100.4F or greater


Increase fluid hydration


***Med/Other Pt SpecificInfo:  Prescription(s) given


Scripts


Levofloxacin (Levaquin)750 Mg Vtz874 Mg PO DAILY  7 Days  Ref 0


   Prov:Herlinda Rivera MD         3/23/17


Disposition:  07 AGAINST MEDICAL ADVICE


Condition:  Stable








Herlinda Rivera MD Mar 22, 2017 21:28

## 2017-03-22 NOTE — RADHPO
EXAM DATE/TIME:  03/22/2017 21:15 

 

[facility] COMPARISON:     

CHEST SINGLE AP, February 27, 2017, 7:11.

 

                     

INDICATIONS :     

Right sided chest pain for 24 hours

                     

 

MEDICAL HISTORY :     

None.          

 

SURGICAL HISTORY :     

None.   

 

ENCOUNTER:     

Initial                                        

 

ACUITY:     

1 day      

 

PAIN SCORE:     

10/10

LOCATION:     Right chest 

 

FINDINGS:     

The lungs are clear without infiltrate, nodule, or mass.  There is no appreciable pleural effusion fo
r technique.  Heart and mediastinum are unremarkable.

 

CONCLUSION:         No acute cardiopulmonary disease.

 

 

 RANDOLPH Miles MD on March 22, 2017 at 21:39           

Board Certified Radiologist.

 This report was verified electronically.

## 2017-03-22 NOTE — RADHPO
EXAM DATE/TIME:  03/22/2017 22:48 

 

[facility] COMPARISON:     

CT PULMONARY ANGIOGRAM, December 08, 2016, 23:22.

 

 

INDICATIONS :     

Shortness of breath and right sided chest pain during inhalation.

                      

 

IV CONTRAST:     

65 cc Omnipaque 350 (iohexol) IV 

 

 

RADIATION DOSE:     

19.03 CTDIvol (mGy) 

 

 

MEDICAL HISTORY :     

Chronic obstructive pulmonary disease.  

 

SURGICAL HISTORY :      

None. 

 

ENCOUNTER:      

Initial

 

ACUITY:      

1 day

 

PAIN SCALE:      

10/10

 

LOCATION:       

Right chest 

 

TECHNIQUE:     

Volumetric scanning of the chest was performed using a pulmonary embolism protocol MIP images were re
constructed.  Using automated exposure control and adjustment of the mA and/or kV according to patien
t size, radiation dose was kept as low as reasonably achievable to obtain optimal diagnostic quality 
images. 

 

FINDINGS:     

There are patchy parenchymal infiltrates in the right upper lobe, left upper lobe, right middle lobe 
and both lower lobes. There is a small right-sided pleural effusion. No pathologically enlarged lymph
 nodes are identified. There is no evidence for pulmonary embolism.

 

CONCLUSION:     

1. Pneumonia with patchy bilateral infiltrates seen. Small right effusion.

2. No evidence of pulmonary embolus.

 

 

 

 Cristo Alcazar MD on March 22, 2017 at 23:13           

Board Certified Radiologist.

 This report was verified electronically.

## 2017-03-23 NOTE — EKG
Date Performed: 03/22/2017       Time Performed: 20:59:52

 

PTAGE:      46 years

 

EKG:      Sinus tachycardia. Septal T wave changes are nonspecific Borderline ECG Compared to prior t
racing no significant change 

 

 PREVIOUS TRACING            : 02/27/2017 07.17

 

DOCTOR:   Ramy Fowler  Interpretating Date/Time  03/23/2017 14:34:16

## 2017-03-25 ENCOUNTER — HOSPITAL ENCOUNTER (EMERGENCY)
Dept: HOSPITAL 17 - PHED | Age: 47
Discharge: HOME | End: 2017-03-25
Payer: COMMERCIAL

## 2017-03-25 VITALS
DIASTOLIC BLOOD PRESSURE: 93 MMHG | HEART RATE: 102 BPM | TEMPERATURE: 97.8 F | OXYGEN SATURATION: 90 % | SYSTOLIC BLOOD PRESSURE: 124 MMHG | RESPIRATION RATE: 22 BRPM

## 2017-03-25 VITALS
OXYGEN SATURATION: 93 % | SYSTOLIC BLOOD PRESSURE: 138 MMHG | RESPIRATION RATE: 16 BRPM | TEMPERATURE: 97.8 F | DIASTOLIC BLOOD PRESSURE: 90 MMHG | HEART RATE: 99 BPM

## 2017-03-25 VITALS — OXYGEN SATURATION: 98 %

## 2017-03-25 VITALS — HEIGHT: 73 IN | WEIGHT: 231.49 LBS | BODY MASS INDEX: 30.68 KG/M2

## 2017-03-25 VITALS
RESPIRATION RATE: 20 BRPM | DIASTOLIC BLOOD PRESSURE: 84 MMHG | OXYGEN SATURATION: 95 % | SYSTOLIC BLOOD PRESSURE: 153 MMHG | HEART RATE: 92 BPM

## 2017-03-25 DIAGNOSIS — J18.9: Primary | ICD-10-CM

## 2017-03-25 LAB
ANION GAP SERPL CALC-SCNC: 7 MEQ/L (ref 5–15)
BASOPHILS # BLD AUTO: 0.3 TH/MM3 (ref 0–0.2)
BASOPHILS NFR BLD: 2.6 % (ref 0–2)
BUN SERPL-MCNC: 14 MG/DL (ref 7–18)
CHLORIDE SERPL-SCNC: 106 MEQ/L (ref 98–107)
EOSINOPHIL # BLD: 0.8 TH/MM3 (ref 0–0.4)
EOSINOPHIL NFR BLD: 7.6 % (ref 0–4)
ERYTHROCYTE [DISTWIDTH] IN BLOOD BY AUTOMATED COUNT: 13.6 % (ref 11.6–17.2)
GFR SERPLBLD BASED ON 1.73 SQ M-ARVRAT: 88 ML/MIN (ref 89–?)
HCO3 BLD-SCNC: 29.7 MEQ/L (ref 21–32)
HCT VFR BLD CALC: 45.3 % (ref 39–51)
HEMO FLAGS: (no result)
LYMPHOCYTES # BLD AUTO: 2.2 TH/MM3 (ref 1–4.8)
LYMPHOCYTES NFR BLD AUTO: 19.7 % (ref 9–44)
MCH RBC QN AUTO: 29.1 PG (ref 27–34)
MCHC RBC AUTO-ENTMCNC: 33.2 % (ref 32–36)
MCV RBC AUTO: 87.7 FL (ref 80–100)
MONOCYTES NFR BLD: 9.7 % (ref 0–8)
NEUTROPHILS # BLD AUTO: 6.8 TH/MM3 (ref 1.8–7.7)
NEUTROPHILS NFR BLD AUTO: 60.4 % (ref 16–70)
PLATELET # BLD: 478 TH/MM3 (ref 150–450)
POTASSIUM SERPL-SCNC: 4.1 MEQ/L (ref 3.5–5.1)
RBC # BLD AUTO: 5.16 MIL/MM3 (ref 4.5–5.9)
SODIUM SERPL-SCNC: 143 MEQ/L (ref 136–145)
WBC # BLD AUTO: 11.2 TH/MM3 (ref 4–11)

## 2017-03-25 PROCEDURE — 80048 BASIC METABOLIC PNL TOTAL CA: CPT

## 2017-03-25 PROCEDURE — 71010: CPT

## 2017-03-25 PROCEDURE — 85025 COMPLETE CBC W/AUTO DIFF WBC: CPT

## 2017-03-25 PROCEDURE — 96374 THER/PROPH/DIAG INJ IV PUSH: CPT

## 2017-03-25 PROCEDURE — 93005 ELECTROCARDIOGRAM TRACING: CPT

## 2017-03-25 PROCEDURE — 99285 EMERGENCY DEPT VISIT HI MDM: CPT

## 2017-03-25 NOTE — PD
HPI


Chief Complaint:  Respiratory Symptoms


Time Seen by Provider:  10:03


Travel History


International Travel<30 days:  No


Contact w/Intl Traveler<30days:  No


Traveled to known affect area:  No





History of Present Illness


HPI


46-year-old male with history of COPD, recently seen in the ER for pneumonia 3 

days ago, had left AGAINST MEDICAL ADVICE after being advised to be admitted, 

presents back to the ER today because of right sided chest discomfort and 

ongoing dyspnea.  He states that this is the same symptoms he had when he was 

here.  He states that ibuprofen would not take the pain away.  He has been on 

Levaquin prescribed to him for several days without improvement in symptoms.  

He denies any new fevers, or any other issues.  He currently rates the pain at 

a 6 out of 10 which worsens with deep breaths and movements.





Modifying Factors: None


Associated Signs & Symptoms: Coughing, dyspnea, chest pain


Risk Factors: Recent pneumonia





PFSH


Past Medical History


Hx Anticoagulant Therapy:  Yes


Anxiety:  Yes


Heart Rhythm Problems:  Yes (TACHYCARDIA)


Cancer:  No


Cardiovascular Problems:  Yes (CHRONIC TACHYCARDIA)


Congestive Heart Failure:  No


COPD:  Yes


Coronary Artery Disease:  No


Diabetes:  No


Diminished Hearing:  No


Diverticulitis:  Yes


Deep Vein Thrombosis:  Yes (PE-FILTER UPPER LEG )


Endocrine:  No


Gastrointestinal Disorders:  Yes (COLOSTOMY JULY 2016, DIVERTICULITIS, SEPSIS)


GERD:  Yes


Genitourinary:  No


Implanted Vascular Access Dvce:  Yes (FILTER)


Musculoskeletal:  No


Neurologic:  No


Psychiatric:  No


Respiratory:  Yes (COPD)


Pregnant?:  Not Pregnant





Past Surgical History


Abdominal Surgery:  Yes (LIVER BIOPSY, COLOSTOMY)


Appendectomy:  Yes


Body Medical Devices:  UZIEL FILTER


Other Surgery:  Yes





Social History


Alcohol Use:  Yes (OCCASIONAL ON WEEKEND)


Tobacco Use:  Yes (1 PACK PER WEEK)


Substance Use:  No





Allergies-Medications


(Allergen,Severity, Reaction):  


Coded Allergies:  


     No Known Allergies (Unverified , 3/25/17)


Reported Meds & Prescriptions





Reported Meds & Active Scripts


Active


Levaquin (Levofloxacin) 750 Mg Tab 750 Mg PO DAILY 7 Days


Breo Ellipta Inh (Fluticasone/Vilanterol) 100-25 Mcg/Act Inh 1 Puff INH DAILY


     Use daily at the same time.


Spiriva Respimat Inh (Tiotropium Inh) 2.5 Mcg/Act Aero 2 Puff INH DAILY


     2.5 mcg = 1 inhalation


Albuterol Neb (Albuterol Sulfate) 2.5 Mg/3 Ml Neb 2.5 Mg NEB Q4HR NEB


     While awake








Review of Systems


Except as stated in HPI:  all other systems reviewed are Neg





Physical Exam


Narrative


GENERAL: Middle age male patient currently not in acute distress at rest.  

Awake and oriented 3.


SKIN: Warm and dry.


HEAD: Atraumatic. Normocephalic. 


EYES: Pupils equal and round. No scleral icterus. No injection or drainage. 


ENT: No nasal bleeding or discharge.  Mucous membranes pink and moist.


NECK: Trachea midline. No JVD. 


CARDIOVASCULAR: Regular rate and rhythm.  No murmur appreciated.


CHEST: Nontender throughout without deformity or crepitance.  No retractions or 

use of accessory muscles.


RESPIRATORY: No accessory muscle use. Clear to auscultation. Breath sounds 

equal bilaterally. 


GASTROINTESTINAL: Abdomen soft, non-tender, nondistended. Hepatic and splenic 

margins not palpable. 


MUSCULOSKELETAL: No obvious deformities. No clubbing.  No cyanosis.  No edema. 


NEUROLOGICAL: Awake and alert. No obvious cranial nerve deficits.  Motor 

grossly within normal limits. Normal speech.


PSYCHIATRIC: Appropriate mood and affect; insight and judgment normal.





Data


Data


Last Documented VS





Vital Signs








  Date Time  Temp Pulse Resp B/P Pulse Ox O2 Delivery O2 Flow Rate FiO2


 


3/25/17 10:45  92 20 153/84 95 Nasal Cannula 2 


 


3/25/17 09:51 97.8       








Orders





 Complete Blood Count With Diff (3/25/17 10:03)


Basic Metabolic Panel (Bmp) (3/25/17 10:03)


Chest, Single Ap (3/25/17 10:13)


Ketorolac Inj (Toradol Inj) (3/25/17 10:45)





Labs








 Laboratory Tests








Test 3/25/17





 10:35


 


White Blood Count 11.2 TH/MM3


 


Red Blood Count 5.16 MIL/MM3


 


Hemoglobin 15.0 GM/DL


 


Hematocrit 45.3 %


 


Mean Corpuscular Volume 87.7 FL


 


Mean Corpuscular Hemoglobin 29.1 PG


 


Mean Corpuscular Hemoglobin 33.2 %





Concent 


 


Red Cell Distribution Width 13.6 %


 


Platelet Count 478 TH/MM3


 


Mean Platelet Volume 7.2 FL


 


Neutrophils (%) (Auto) 60.4 %


 


Lymphocytes (%) (Auto) 19.7 %


 


Monocytes (%) (Auto) 9.7 %


 


Eosinophils (%) (Auto) 7.6 %


 


Basophils (%) (Auto) 2.6 %


 


Neutrophils # (Auto) 6.8 TH/MM3


 


Lymphocytes # (Auto) 2.2 TH/MM3


 


Monocytes # (Auto) 1.1 TH/MM3


 


Eosinophils # (Auto) 0.8 TH/MM3


 


Basophils # (Auto) 0.3 TH/MM3


 


CBC Comment DIFF FINAL 


 


Differential Comment  


 


Sodium Level 143 MEQ/L


 


Potassium Level 4.1 MEQ/L


 


Chloride Level 106 MEQ/L


 


Carbon Dioxide Level 29.7 MEQ/L


 


Anion Gap 7 MEQ/L


 


Blood Urea Nitrogen 14 MG/DL


 


Random Glucose 97 MG/DL


 


Calcium Level 8.7 MG/DL














MDM


Medical Decision Making


Medical Screen Exam Complete:  Yes


Emergency Medical Condition:  Yes


Medical Record Reviewed:  Yes


Interpretation(s)


EKG shows NSR, no ST elevation or depression, and no arrhythmias.  No  

significant T-wave inversions.








Laboratory Tests








Test 3/25/17





 10:35


 


White Blood Count 11.2 TH/MM3





 (4.0-11.0)


 


Platelet Count 478 TH/MM3





 (150-450)


 


Monocytes (%) (Auto) 9.7 % (0.0-8.0)


 


Eosinophils (%) (Auto) 7.6 % (0.0-4.0)


 


Basophils (%) (Auto) 2.6 % (0.0-2.0)


 


Monocytes # (Auto) 1.1 TH/MM3





 (0-0.9)


 


Eosinophils # (Auto) 0.8 TH/MM3





 (0-0.4)


 


Basophils # (Auto) 0.3 TH/MM3





 (0-0.2)








Differential Diagnosis


Right sided chest pains, cough, dyspneaworsening pneumonia versus 

costochondritis versus pleurisy versus other acute pulmonary processes


Narrative Course


Chest x-ray shows pneumonia seen previously on CAT scan.  However, no signs of 

pneumothorax or any signs of acute processes.  His lab work did not show 

significant metabolic issues and his white blood cell count is improving.  

Patient is ambulatory in the ER without issues.  At this point, I have talked 

to him about the risks and benefits of outpatient therapy versus observation 

admission.  Patient states that he works as a  and has not had any 

trouble with shortness of breath despite his pneumonia.  At this point, he does 

not want to be admitted to the hospital.  However, he does feel that he needs 

something to help control his chest wall pains.  My plan would be to give him 

symptomatic relief or pain.  I do not believe that he has any new acute 

processes.  I believe that this is secondary to his pneumonia.  He should 

return for any worsening in symptoms as necessary.  The plan has been discussed 

with him and he states understanding.





Diagnosis





 Primary Impression:  


 Chest pain


 Additional Impression:  


 Pneumonia


***Med/Other Pt SpecificInfo:  Prescription(s) given


Scripts


Hydrocodone-Acetaminophen (Lortab)5-325 Mg Tab1 Tab PO Q6H PRN (PAIN) #12 TAB  

Ref 0


   Prov:Lola Padron MD         3/25/17 


Ibuprofen (Motrin Ib)200 Mg [id number] Mg PO Q6H PRN (PAIN SCALE 1 TO 10) #21 TAB  

Ref 0


   Prov:Lola Padron MD         3/25/17


Disposition:  01 DISCHARGE HOME


Condition:  Stable








Lola Padron MD Mar 25, 2017 10:12

## 2017-03-25 NOTE — RADHPO
EXAM DATE/TIME:  03/25/2017 10:35 

 

[facility] COMPARISON:     

CHEST SINGLE AP, March 22, 2017, 21:15.

 

                     

INDICATIONS :     

Right side chest pain for five days. 

                     

 

MEDICAL HISTORY :     

None.          

 

SURGICAL HISTORY :     

None.   

 

ENCOUNTER:     

Initial                                        

 

ACUITY:     

4 - 6 days      

 

PAIN SCORE:     

10/10

 

LOCATION:     

Right upper chest 

 

FINDINGS:     

Single AP view of the chest demonstrates bibasilar airspace consolidation, right worse than left. Thi
s is new as compared to the prior exam. Heart size is normal. The pulmonary vasculature is normal. Os
seous structures are intact.

 

 

CONCLUSION:     

Findings consistent with acute pneumonia involving the bilateral lung bases, right greater than left.


 

 

 

 Kelley Mendes MD on March 25, 2017 at 10:49           

Board Certified Radiologist.

 This report was verified electronically.

## 2017-03-26 NOTE — EKG
Date Performed: 03/25/2017       Time Performed: 09:32:12

 

PTAGE:      46 years

 

EKG:      Sinus rhythm 

 

 with Premature atrial contractions Compared to prior tracing no significant change Borderline ECG

 

PREVIOUS TRACING       : 03/22/2017 20.59

 

DOCTOR:   Dennis Jimenes  Interpretating Date/Time  03/26/2017 15:51:55

## 2017-04-23 ENCOUNTER — HOSPITAL ENCOUNTER (EMERGENCY)
Dept: HOSPITAL 17 - PHED | Age: 47
LOS: 1 days | Discharge: HOME | End: 2017-04-24
Payer: COMMERCIAL

## 2017-04-23 VITALS — BODY MASS INDEX: 30.39 KG/M2 | HEIGHT: 73 IN | WEIGHT: 229.28 LBS

## 2017-04-23 VITALS
DIASTOLIC BLOOD PRESSURE: 82 MMHG | OXYGEN SATURATION: 87 % | HEART RATE: 120 BPM | TEMPERATURE: 98.3 F | RESPIRATION RATE: 24 BRPM | SYSTOLIC BLOOD PRESSURE: 112 MMHG

## 2017-04-23 VITALS — RESPIRATION RATE: 24 BRPM | OXYGEN SATURATION: 92 %

## 2017-04-23 VITALS
HEART RATE: 114 BPM | SYSTOLIC BLOOD PRESSURE: 133 MMHG | DIASTOLIC BLOOD PRESSURE: 90 MMHG | TEMPERATURE: 98 F | OXYGEN SATURATION: 95 % | RESPIRATION RATE: 24 BRPM

## 2017-04-23 DIAGNOSIS — R00.0: ICD-10-CM

## 2017-04-23 DIAGNOSIS — F17.210: ICD-10-CM

## 2017-04-23 DIAGNOSIS — Z86.718: ICD-10-CM

## 2017-04-23 DIAGNOSIS — Z79.01: ICD-10-CM

## 2017-04-23 DIAGNOSIS — J44.1: Primary | ICD-10-CM

## 2017-04-23 LAB
ANION GAP SERPL CALC-SCNC: 7 MEQ/L (ref 5–15)
BASOPHILS # BLD AUTO: 0.1 TH/MM3 (ref 0–0.2)
BASOPHILS NFR BLD: 0.6 % (ref 0–2)
BUN SERPL-MCNC: 13 MG/DL (ref 7–18)
CHLORIDE SERPL-SCNC: 110 MEQ/L (ref 98–107)
EOSINOPHIL # BLD: 1.2 TH/MM3 (ref 0–0.4)
EOSINOPHIL NFR BLD: 9.3 % (ref 0–4)
ERYTHROCYTE [DISTWIDTH] IN BLOOD BY AUTOMATED COUNT: 13.5 % (ref 11.6–17.2)
GFR SERPLBLD BASED ON 1.73 SQ M-ARVRAT: 98 ML/MIN (ref 89–?)
HCO3 BLD-SCNC: 25.1 MEQ/L (ref 21–32)
HCT VFR BLD CALC: 45.8 % (ref 39–51)
HEMO FLAGS: (no result)
LYMPHOCYTES # BLD AUTO: 2.7 TH/MM3 (ref 1–4.8)
LYMPHOCYTES NFR BLD AUTO: 20.1 % (ref 9–44)
MCH RBC QN AUTO: 28.4 PG (ref 27–34)
MCHC RBC AUTO-ENTMCNC: 32.6 % (ref 32–36)
MCV RBC AUTO: 87.1 FL (ref 80–100)
MONOCYTES NFR BLD: 10 % (ref 0–8)
NEUTROPHILS # BLD AUTO: 8 TH/MM3 (ref 1.8–7.7)
NEUTROPHILS NFR BLD AUTO: 60 % (ref 16–70)
PLATELET # BLD: 393 TH/MM3 (ref 150–450)
POTASSIUM SERPL-SCNC: 3.9 MEQ/L (ref 3.5–5.1)
RBC # BLD AUTO: 5.26 MIL/MM3 (ref 4.5–5.9)
SODIUM SERPL-SCNC: 142 MEQ/L (ref 136–145)
WBC # BLD AUTO: 13.3 TH/MM3 (ref 4–11)

## 2017-04-23 PROCEDURE — 94664 DEMO&/EVAL PT USE INHALER: CPT

## 2017-04-23 PROCEDURE — 80048 BASIC METABOLIC PNL TOTAL CA: CPT

## 2017-04-23 PROCEDURE — 85025 COMPLETE CBC W/AUTO DIFF WBC: CPT

## 2017-04-23 PROCEDURE — 71020: CPT

## 2017-04-23 PROCEDURE — 99285 EMERGENCY DEPT VISIT HI MDM: CPT

## 2017-04-23 PROCEDURE — 96374 THER/PROPH/DIAG INJ IV PUSH: CPT

## 2017-04-23 PROCEDURE — 94640 AIRWAY INHALATION TREATMENT: CPT

## 2017-04-23 RX ADMIN — IPRATROPIUM BROMIDE AND ALBUTEROL SULFATE SCH AMPULE: .5; 3 SOLUTION RESPIRATORY (INHALATION) at 23:30

## 2017-04-23 RX ADMIN — IPRATROPIUM BROMIDE AND ALBUTEROL SULFATE SCH AMPULE: .5; 3 SOLUTION RESPIRATORY (INHALATION) at 23:10

## 2017-04-23 RX ADMIN — IPRATROPIUM BROMIDE AND ALBUTEROL SULFATE SCH AMPULE: .5; 3 SOLUTION RESPIRATORY (INHALATION) at 23:19

## 2017-04-24 VITALS
HEART RATE: 120 BPM | SYSTOLIC BLOOD PRESSURE: 147 MMHG | RESPIRATION RATE: 22 BRPM | OXYGEN SATURATION: 94 % | DIASTOLIC BLOOD PRESSURE: 94 MMHG

## 2017-04-24 VITALS
HEART RATE: 120 BPM | SYSTOLIC BLOOD PRESSURE: 142 MMHG | OXYGEN SATURATION: 96 % | DIASTOLIC BLOOD PRESSURE: 90 MMHG | RESPIRATION RATE: 22 BRPM

## 2017-04-24 VITALS
RESPIRATION RATE: 22 BRPM | HEART RATE: 120 BPM | SYSTOLIC BLOOD PRESSURE: 142 MMHG | DIASTOLIC BLOOD PRESSURE: 90 MMHG | OXYGEN SATURATION: 94 %

## 2017-04-24 VITALS — OXYGEN SATURATION: 96 %

## 2017-04-24 VITALS
OXYGEN SATURATION: 95 % | HEART RATE: 116 BPM | SYSTOLIC BLOOD PRESSURE: 148 MMHG | DIASTOLIC BLOOD PRESSURE: 85 MMHG | RESPIRATION RATE: 22 BRPM

## 2017-04-24 VITALS — SYSTOLIC BLOOD PRESSURE: 132 MMHG | DIASTOLIC BLOOD PRESSURE: 73 MMHG

## 2017-04-24 RX ADMIN — IPRATROPIUM BROMIDE AND ALBUTEROL SULFATE SCH AMPULE: .5; 3 SOLUTION RESPIRATORY (INHALATION) at 00:44

## 2017-04-24 RX ADMIN — IPRATROPIUM BROMIDE AND ALBUTEROL SULFATE SCH AMPULE: .5; 3 SOLUTION RESPIRATORY (INHALATION) at 00:34

## 2017-04-24 NOTE — PD
HPI


Chief Complaint:  Respiratory Symptoms


Time Seen by Provider:  23:02


Travel History


International Travel<30 days:  No


Contact w/Intl Traveler<30days:  No


Traveled to known affect area:  No





History of Present Illness


HPI


The patient is a 46-year-old male with a history of COPD who complains of 

shortness of breath and wheezing since this evening.  He states he has an 

appointment tomorrow at [facility] and he does not want to miss it.  He 

continues to smoke one half to one pack a day.  He denies any fever.  He does 

not want to be admitted.  He denies any chest pain.





PFSH


Past Medical History


Hx Anticoagulant Therapy:  Yes


Anxiety:  Yes


Heart Rhythm Problems:  Yes (TACHYCARDIA)


Cancer:  No


Cardiovascular Problems:  Yes (CHRONIC TACHYCARDIA)


Congestive Heart Failure:  No


COPD:  Yes


Coronary Artery Disease:  No


Diabetes:  No


Diminished Hearing:  No


Diverticulitis:  Yes


Deep Vein Thrombosis:  Yes (PE-FILTER UPPER LEG )


Endocrine:  No


Gastrointestinal Disorders:  Yes (COLOSTOMY JULY 2016, DIVERTICULITIS, SEPSIS)


GERD:  Yes


Genitourinary:  No


Implanted Vascular Access Dvce:  Yes (FILTER)


Musculoskeletal:  No


Neurologic:  No


Psychiatric:  No


Respiratory:  Yes (COPD)


Immunizations Current:  Yes


Tetanus Vaccination:  > 5 Years


Influenza Vaccination:  Yes





Past Surgical History


Abdominal Surgery:  Yes (LIVER BIOPSY, COLOSTOMY)


Appendectomy:  Yes


Body Medical Devices:  UZIEL FILTER


Other Surgery:  Yes





Social History


Alcohol Use:  Yes (OCCASIONAL ON WEEKEND)


Tobacco Use:  Yes (1 PACK PER WEEK)


Substance Use:  No





Allergies-Medications


(Allergen,Severity, Reaction):  


Coded Allergies:  


     No Known Allergies (Unverified , 4/23/17)


Reported Meds & Prescriptions





Reported Meds & Active Scripts


Active


Zithromax (Azithromycin) 500 Mg Tab 500 Mg PO DAILY 5 Days


Breo Ellipta Inh (Fluticasone/Vilanterol) 100-25 Mcg/Act Inh 1 Puff INH DAILY


     Use daily at the same time.


Spiriva Respimat Inh (Tiotropium Inh) 2.5 Mcg/Act Aero 2 Puff INH DAILY


     2.5 mcg = 1 inhalation


Albuterol Neb (Albuterol Sulfate) 2.5 Mg/3 Ml Neb 2.5 Mg NEB Q4HR NEB


     While awake








Review of Systems


Except as stated in HPI:  all other systems reviewed are Neg





Physical Exam


Narrative


GENERAL: The patient is alert, oriented 3 in moderate respirator distress.  

His vitals signs show pulse of 120 and respirations 24 with oximetry 87% on 

room air.


SKIN: Focused skin assessment warm/dry.


HEAD: Atraumatic. Normocephalic. 


EYES: Pupils equal and round. No scleral icterus. No injection or drainage. 


ENT: No nasal bleeding or discharge.  Mucous membranes pink and moist.


NECK: Trachea midline. No JVD. 


CARDIOVASCULAR: Regular rate and rhythm.  No murmur appreciated.


RESPIRATORY: No accessory muscle use.  Bilateral wheezes are heard in all lung 

fields. Breath sounds equal bilaterally. 


GASTROINTESTINAL: Abdomen soft, non-tender, nondistended. Hepatic and splenic 

margins not palpable. 


MUSCULOSKELETAL: No obvious deformities. No clubbing.  No cyanosis.  No edema. 


NEUROLOGICAL: Awake and alert. No obvious cranial nerve deficits.  Motor 

grossly within normal limits. Normal speech.


PSYCHIATRIC: Appropriate mood and affect; insight and judgment normal.





Data


Data


Last Documented VS





Vital Signs








  Date Time  Temp Pulse Resp B/P Pulse Ox O2 Delivery O2 Flow Rate FiO2


 


4/24/17 01:00  120 22 142/90 96 Nasal Cannula 2 


 


4/23/17 23:30 98.0       








Orders





 Complete Blood Count With Diff (4/23/17 23:02)


Basic Metabolic Panel (Bmp) (4/23/17 23:02)


Iv Access Insert/Monitor (4/23/17 23:02)


Ecg Monitoring (4/23/17 23:02)


Oximetry (4/23/17 23:02)


Oxygen Administration (4/23/17 23:02)


Sodium Chloride 0.9% Flush (Ns Flush) (4/23/17 23:15)


Methylprednisolone So Succ Inj (Solumedr (4/23/17 23:15)


Albuterol-Ipratropium Neb (Duoneb Neb) (4/23/17 23:15)


Chest, Pa & Lat (4/23/17 23:43)


Albuterol-Ipratropium Neb (Duoneb Neb) (4/24/17 00:30)


Albuterol-Ipratropium Neb (Duoneb Neb) (4/24/17 01:15)





Labs





 Laboratory Tests








Test 4/23/17





 23:10


 


White Blood Count 13.3 TH/MM3


 


Red Blood Count 5.26 MIL/MM3


 


Hemoglobin 15.0 GM/DL


 


Hematocrit 45.8 %


 


Mean Corpuscular Volume 87.1 FL


 


Mean Corpuscular Hemoglobin 28.4 PG


 


Mean Corpuscular Hemoglobin 32.6 %





Concent 


 


Red Cell Distribution Width 13.5 %


 


Platelet Count 393 TH/MM3


 


Mean Platelet Volume 7.8 FL


 


Neutrophils (%) (Auto) 60.0 %


 


Lymphocytes (%) (Auto) 20.1 %


 


Monocytes (%) (Auto) 10.0 %


 


Eosinophils (%) (Auto) 9.3 %


 


Basophils (%) (Auto) 0.6 %


 


Neutrophils # (Auto) 8.0 TH/MM3


 


Lymphocytes # (Auto) 2.7 TH/MM3


 


Monocytes # (Auto) 1.3 TH/MM3


 


Eosinophils # (Auto) 1.2 TH/MM3


 


Basophils # (Auto) 0.1 TH/MM3


 


CBC Comment DIFF FINAL 


 


Differential Comment  


 


Sodium Level 142 MEQ/L


 


Potassium Level 3.9 MEQ/L


 


Chloride Level 110 MEQ/L


 


Carbon Dioxide Level 25.1 MEQ/L


 


Anion Gap 7 MEQ/L


 


Blood Urea Nitrogen 13 MG/DL


 


Creatinine 0.84 MG/DL


 


Estimat Glomerular Filtration 98 ML/MIN





Rate 


 


Random Glucose 94 MG/DL


 


Calcium Level 8.3 MG/DL











[facility]


Medical Decision Making


Medical Screen Exam Complete:  Yes


Emergency Medical Condition:  Yes


Medical Record Reviewed:  Yes


Interpretation(s)


The chest x-ray shows mild residual atelectasis the right costophrenic angle, 

otherwise lungs are grossly clear.


Differential Diagnosis


Status asthmaticus, pneumonia, hypoxemia, bronchitis, continued tobacco abuse, 

electrolyte disorder, COPD with acute exacerbation


Narrative Course


It is now 0100 and the patient is sleeping and his oximetry on room air is 96%.

  His wheezes have diminished.  He is still offered admission but we will give 

him another breathing treatment and discharge him.  He will be given Zithromax 

500 mg daily for 5 days and a tapered course of prednisone.  His chest x-ray is 

unremarkable.





The patient is once again advised to be admitted but he will not be admitted.  

He wants DuoNeb for his nebulizer.  I will give him a prescription for Zithromax

, prednisone and a DuoNeb nebulizer treatments.





Diagnosis





 Primary Impression:  


 COPD with acute exacerbation





***Additional Instructions:


As we discussed, you were offered admission but he wanted to make her 

appointment tomorrow at [facility].  The Zithromax is one tablet daily for 

5 days.  We gave you one tablet tonight.  The prednisone is one tablet twice 

daily for 4 days followed by one tablet once daily for 4 days.


***Med/Other Pt SpecificInfo:  Prescription(s) given


Scripts


Ipratropium-Albuterol Neb (Duoneb)0.5-2.5 Mg/3 Ml Neb1 Nebule INH Q6HR NEB  #60 

NEBULE  Ref 0


   Prov:Killian Reed MD         4/24/17 


Prednisone 50 Mg Tab50 Mg PO BID  #12 TAB  Ref 0


   Prov:Killian Reed MD         4/24/17 


Azithromycin (Zithromax)500 Mg Bmb608 Mg PO DAILY  5 Days  Ref 0


   Prov:Killian Reed MD         4/24/17


Disposition:  01 DISCHARGE HOME


Condition:  Stable








Killian Reed MD Apr 24, 2017 00:28

## 2017-04-24 NOTE — RADHPO
EXAM DATE/TIME:  04/24/2017 01:04 

 

[facility] COMPARISON:     

CHEST SINGLE AP, March 22, 2017, 21:15.  CHEST SINGLE AP, March 25, 2017, 10:35.

 

                     

INDICATIONS :     

Short of breath.

                     

 

MEDICAL HISTORY :     

Chronic obstructive pulmonary disease.  Diverticulitis.        

 

SURGICAL HISTORY :        

Colostomy

 

ENCOUNTER:     

Initial                                        

 

ACUITY:     

1 day      

 

PAIN SCORE:     

0/10

 

LOCATION:     

Bilateral chest 

 

FINDINGS:     

PA and lateral views of the chest demonstrate the lungs to be symmetrically aerated without evidence 
of mass, infiltrate or effusion. The previously noted right lower lung infiltrate has essentially res
olved. There is a small amount of residual atelectasis in the right costophrenic angle.  The cardiome
diastinal contours are unremarkable.  Osseous structures are intact.

 

CONCLUSION:     

1. Mild residual atelectasis right costophrenic angle.

2. Otherwise, the lungs are grossly clear.

 

 

 

 Quinten Og MD on April 24, 2017 at 1:20           

Board Certified Radiologist.

 This report was verified electronically.

## 2017-10-20 ENCOUNTER — HOSPITAL ENCOUNTER (EMERGENCY)
Dept: HOSPITAL 17 - PHED | Age: 47
Discharge: HOME | End: 2017-10-20
Payer: COMMERCIAL

## 2017-10-20 VITALS — OXYGEN SATURATION: 84 %

## 2017-10-20 VITALS
SYSTOLIC BLOOD PRESSURE: 135 MMHG | RESPIRATION RATE: 26 BRPM | OXYGEN SATURATION: 92 % | HEART RATE: 110 BPM | TEMPERATURE: 97.9 F | DIASTOLIC BLOOD PRESSURE: 94 MMHG

## 2017-10-20 VITALS
DIASTOLIC BLOOD PRESSURE: 76 MMHG | HEART RATE: 115 BPM | RESPIRATION RATE: 20 BRPM | OXYGEN SATURATION: 92 % | SYSTOLIC BLOOD PRESSURE: 140 MMHG

## 2017-10-20 VITALS — WEIGHT: 213.85 LBS | HEIGHT: 73 IN | BODY MASS INDEX: 28.34 KG/M2

## 2017-10-20 DIAGNOSIS — K21.9: ICD-10-CM

## 2017-10-20 DIAGNOSIS — J44.1: Primary | ICD-10-CM

## 2017-10-20 DIAGNOSIS — Z86.718: ICD-10-CM

## 2017-10-20 DIAGNOSIS — F17.200: ICD-10-CM

## 2017-10-20 LAB
ALP SERPL-CCNC: 87 U/L (ref 45–117)
ALT SERPL-CCNC: 116 U/L (ref 12–78)
ANION GAP SERPL CALC-SCNC: 7 MEQ/L (ref 5–15)
APTT BLD: 32.9 SEC (ref 24.3–30.1)
AST SERPL-CCNC: 51 U/L (ref 15–37)
BASE EXCESS BLD CALC-SCNC: 0.1 MMOL/L (ref -2–2)
BASOPHILS # BLD AUTO: 0.1 TH/MM3 (ref 0–0.2)
BASOPHILS NFR BLD: 0.7 % (ref 0–2)
BENZODIAZEPINES PNL UR: 89 % (ref 90–100)
BILIRUB SERPL-MCNC: 0.6 MG/DL (ref 0.2–1)
BLOOD GAS CARBOXYHEMOGLOBIN: 1.6 % (ref 0–4)
BLOOD GAS HCO3: 24 MMOL/L (ref 22–26)
BLOOD GAS OXYGEN CONTENT: 20.9 VOL % (ref 12–20)
BLOOD GAS PCO2: 40 MMHG (ref 38–42)
BUN SERPL-MCNC: 15 MG/DL (ref 7–18)
CHLORIDE SERPL-SCNC: 104 MEQ/L (ref 98–107)
CRITICAL VALUE: YES
DRAW SITE: (no result)
EOSINOPHIL # BLD: 2 TH/MM3 (ref 0–0.4)
EOSINOPHIL NFR BLD: 10.6 % (ref 0–4)
ERYTHROCYTE [DISTWIDTH] IN BLOOD BY AUTOMATED COUNT: 16.2 % (ref 11.6–17.2)
GFR SERPLBLD BASED ON 1.73 SQ M-ARVRAT: 86 ML/MIN (ref 89–?)
HCO3 BLD-SCNC: 26 MEQ/L (ref 21–32)
HCT VFR BLD CALC: 50.2 % (ref 39–51)
HEMO FLAGS: (no result)
INR PPP: 1 RATIO
LYMPHOCYTES # BLD AUTO: 3.1 TH/MM3 (ref 1–4.8)
LYMPHOCYTES NFR BLD AUTO: 16.3 % (ref 9–44)
MCH RBC QN AUTO: 28.3 PG (ref 27–34)
MCHC RBC AUTO-ENTMCNC: 33.6 % (ref 32–36)
MCV RBC AUTO: 84.3 FL (ref 80–100)
METHGB MFR BLDA: 1.3 % (ref 0–2)
MONOCYTES NFR BLD: 6.8 % (ref 0–8)
NEUTROPHILS # BLD AUTO: 12.4 TH/MM3 (ref 1.8–7.7)
NEUTROPHILS NFR BLD AUTO: 65.6 % (ref 16–70)
NUMBER OF ARTERIAL PUNCTURES: 1
O2/TOTAL GAS SETTING VFR VENT: 21 %
OXYGEN DEVICE: (no result)
PLATELET # BLD: 467 TH/MM3 (ref 150–450)
PO2 BLD: 63 MMHG (ref 61–120)
POTASSIUM SERPL-SCNC: 4.3 MEQ/L (ref 3.5–5.1)
PROTHROMBIN TIME: 10.9 SEC (ref 9.8–11.6)
RBC # BLD AUTO: 5.96 MIL/MM3 (ref 4.5–5.9)
SALICYLATES SERPL-MCNC: 16.7 G/DL (ref 12–16)
SODIUM SERPL-SCNC: 137 MEQ/L (ref 136–145)
STAT: YES
TEMP CORR TO: 98.6
ULNAR PULSE: PRESENT
WBC # BLD AUTO: 18.9 TH/MM3 (ref 4–11)

## 2017-10-20 PROCEDURE — 99285 EMERGENCY DEPT VISIT HI MDM: CPT

## 2017-10-20 PROCEDURE — 83880 ASSAY OF NATRIURETIC PEPTIDE: CPT

## 2017-10-20 PROCEDURE — 85610 PROTHROMBIN TIME: CPT

## 2017-10-20 PROCEDURE — 71275 CT ANGIOGRAPHY CHEST: CPT

## 2017-10-20 PROCEDURE — 94640 AIRWAY INHALATION TREATMENT: CPT

## 2017-10-20 PROCEDURE — 96374 THER/PROPH/DIAG INJ IV PUSH: CPT

## 2017-10-20 PROCEDURE — 84484 ASSAY OF TROPONIN QUANT: CPT

## 2017-10-20 PROCEDURE — 36600 WITHDRAWAL OF ARTERIAL BLOOD: CPT

## 2017-10-20 PROCEDURE — 80053 COMPREHEN METABOLIC PANEL: CPT

## 2017-10-20 PROCEDURE — 71010: CPT

## 2017-10-20 PROCEDURE — 82805 BLOOD GASES W/O2 SATURATION: CPT

## 2017-10-20 PROCEDURE — 85025 COMPLETE CBC W/AUTO DIFF WBC: CPT

## 2017-10-20 PROCEDURE — 85730 THROMBOPLASTIN TIME PARTIAL: CPT

## 2017-10-20 PROCEDURE — 94664 DEMO&/EVAL PT USE INHALER: CPT

## 2017-10-20 RX ADMIN — IPRATROPIUM BROMIDE AND ALBUTEROL SULFATE SCH AMPULE: .5; 3 SOLUTION RESPIRATORY (INHALATION) at 06:36

## 2017-10-20 NOTE — RADRPT
EXAM DATE/TIME:  10/20/2017 07:20 

 

[facility] COMPARISON:     

CT PULMONARY ANGIOGRAM, March 22, 2017, 22:48.

 

 

INDICATIONS :     

Shortness of breath since 3 a.m. 

                      

 

IV CONTRAST:     

75 cc Omnipaque 350 (iohexol) IV 

 

 

RADIATION DOSE:     

15.56 CTDIvol (mGy) 

 

 

MEDICAL HISTORY :     

Chronic obstructive pulmonary disease. Gastroesophageal reflux disease. 

 

SURGICAL HISTORY :      

Colostomy. PE filter upper leg, liver biopsy

 

ENCOUNTER:      

Initial

 

ACUITY:      

1 day

 

PAIN SCALE:      

0/10

 

LOCATION:        

chest 

 

TECHNIQUE:     

Volumetric scanning of the chest was performed using a pulmonary embolism protocol MIP images were re
constructed.  Using automated exposure control and adjustment of the mA and/or kV according to patien
t size, radiation dose was kept as low as reasonably achievable to obtain optimal diagnostic quality 
images.   DICOM format image data is available electronically for review and comparison.  

 

Follow-up recommendations for detected pulmonary nodules are based at a minimum on nodule size and pa
tient risk factors according to Fleischner Society Guidelines.

 

FINDINGS:      

 

PULMONARY ARTERIES:

No filling defects are seen in the pulmonary arteries through the segmental level.

 

LUNGS:     

Scattered ground-glass patchy infiltrates are noted the within the lower lobes bilaterally, lingula o
f the left upper lobe, and anterior medial aspect of the right upper lobe. These were noted previousl
y and are somewhat improved compared to March of 2017. There is no pneumothorax .  No concerning pulm
onary nodule is visualized. Minimal emphysematous changes are noted within the right apex.

 

PLEURAE:     

There is no pleural thickening or pleural effusion.

 

MEDIASTINUM:     

There is good visualization of the great vessels of the middle mediastinum. There are mildly enlarged
 mediastinal lymph nodes measuring 1.7 x 0.9 cm in the precarinal space and 1.7 x 1.1 cm in the AP wi
ndow. There is also mild enlarged right hilar lymph node measuring 1.8 x 1.6 cm. These mildly enlarge
d lymph nodes are stable compared to the previous examination in March.

 

MUSCULOSKELETAL:     

Within normal limits for patient age.

 

MISCELLANEOUS:     

The visualized upper abdominal organs demonstrate no acute abnormality.

 

CONCLUSION:     

1. No evidence of pulmonary embolism. 

2. Scattered ground-glass infiltrates bilaterally which are slightly improved compared to March of 20
17. 

3. Stable mildly prominent precarinal and AP window mediastinal and right hilar lymphadenopathy.

 

 

 

 

 Zhen Felton MD on October 20, 2017 at 7:37           

Board Certified Radiologist.

 This report was verified electronically.

## 2017-10-20 NOTE — RADRPT
EXAM DATE/TIME:  10/20/2017 06:24 

 

[facility] COMPARISON:     

CHEST SINGLE AP, March 25, 2017, 10:35.

 

                     

INDICATIONS :     

Shortness of breath for 24 hours

                     

 

MEDICAL HISTORY :     

None.          

 

SURGICAL HISTORY :     

None.   

 

ENCOUNTER:     

Initial                                        

 

ACUITY:     

1 day      

 

PAIN SCORE:     

0/10

 

LOCATION:     

Bilateral chest 

 

FINDINGS:     

A single view of the chest demonstrates the lungs to be symmetrically aerated without evidence of mas
s, infiltrate or effusion.  The cardiomediastinal contours are unremarkable. Old left-sided rib fract
ure.

 

CONCLUSION:     No acute disease.  

 

 

 

 Karthik Enriquez MD on October 20, 2017 at 6:47           

Board Certified Radiologist.

 This report was verified electronically.
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
## 2017-10-20 NOTE — PD
Physical Exam


Date Seen by Provider:  Oct 20, 2017


Time Seen by Provider:  07:59


Narrative


The patient is a 47-year-old male who was initially evaluated by the previous 

physician, Dr. Reed.  Please refer to the initial history, physical, 

diagnostic evaluation, and treatment modality plan.  The patient was signed out 

at 7:15 AM with CT pulmonary angiogram pending.  The patient does have a 

history of COPD and previous pulmonary embolism and currently has an inferior 

vena cava filter in place.





Data


Data


Last Documented VS





Vital Signs








  Date Time  Temp Pulse Resp B/P (MAP) Pulse Ox O2 Delivery O2 Flow Rate FiO2


 


10/20/17 07:05  115 20 140/76 (97) 92 Aerosol Mask  


 


10/20/17 06:20       2.00 


 


10/20/17 06:08 97.9       








Orders





 Orders


Iv Access Insert/Monitor (10/20/17 06:17)


Ecg Monitoring (10/20/17 06:17)


Oximetry (10/20/17 06:17)


Oxygen Administration (10/20/17 06:17)


Chest, Single Ap (10/20/17 06:17)


Sodium Chloride 0.9% Flush (Ns Flush) (10/20/17 06:30)


Methylprednisolone So Succ Inj (Solumedr (10/20/17 06:30)


Albuterol-Ipratropium Neb (Duoneb Neb) (10/20/17 06:30)


Ct Pulmonary Angiogram (10/20/17 06:20)


Sodium Chloride 0.9% Flush (Ns Flush) (10/20/17 06:30)


Complete Blood Count With Diff (10/20/17 06:35)


Comprehensive Metabolic Panel (10/20/17 06:35)


B-Type Natriuretic Peptide (10/20/17 06:35)


Act Partial Throm Time (Ptt) (10/20/17 06:35)


Prothrombin Time / Inr (Pt) (10/20/17 06:35)


Troponin I (10/20/17 06:35)


Arterial Blood Gas (Abg) (10/20/17 06:35)


Urinalysis - C+S If Indicated (10/20/17 06:35)


Sodium Chloride 0.9% Flush (Ns Flush) (10/20/17 06:45)


Iohexol 350 Inj (Omnipaque 350 Inj) (10/20/17 07:25)





Labs





Laboratory Tests








Test


  10/20/17


06:41 10/20/17


07:13


 


White Blood Count 18.9 TH/MM3  


 


Red Blood Count 5.96 MIL/MM3  


 


Hemoglobin 16.9 GM/DL  


 


Hematocrit 50.2 %  


 


Mean Corpuscular Volume 84.3 FL  


 


Mean Corpuscular Hemoglobin 28.3 PG  


 


Mean Corpuscular Hemoglobin


Concent 33.6 % 


  


 


 


Red Cell Distribution Width 16.2 %  


 


Platelet Count 467 TH/MM3  


 


Mean Platelet Volume 7.8 FL  


 


Neutrophils (%) (Auto) 65.6 %  


 


Lymphocytes (%) (Auto) 16.3 %  


 


Monocytes (%) (Auto) 6.8 %  


 


Eosinophils (%) (Auto) 10.6 %  


 


Basophils (%) (Auto) 0.7 %  


 


Neutrophils # (Auto) 12.4 TH/MM3  


 


Lymphocytes # (Auto) 3.1 TH/MM3  


 


Monocytes # (Auto) 1.3 TH/MM3  


 


Eosinophils # (Auto) 2.0 TH/MM3  


 


Basophils # (Auto) 0.1 TH/MM3  


 


CBC Comment DIFF FINAL  


 


Differential Comment   


 


Prothrombin Time 10.9 SEC  


 


Prothromb Time International


Ratio 1.0 RATIO 


  


 


 


Activated Partial


Thromboplast Time 32.9 SEC 


  


 


 


Blood Urea Nitrogen 15 MG/DL  


 


Creatinine 0.93 MG/DL  


 


Random Glucose 96 MG/DL  


 


Total Protein 8.2 GM/DL  


 


Albumin 3.7 GM/DL  


 


Calcium Level 8.9 MG/DL  


 


Alkaline Phosphatase 87 U/L  


 


Aspartate Amino Transf


(AST/SGOT) 51 U/L 


  


 


 


Alanine Aminotransferase


(ALT/SGPT) 116 U/L 


  


 


 


Total Bilirubin 0.6 MG/DL  


 


Sodium Level 137 MEQ/L  


 


Potassium Level 4.3 MEQ/L  


 


Chloride Level 104 MEQ/L  


 


Carbon Dioxide Level 26.0 MEQ/L  


 


Anion Gap 7 MEQ/L  


 


Estimat Glomerular Filtration


Rate 86 ML/MIN 


  


 


 


Troponin I


  LESS THAN 0.02


NG/ML 


 


 


Blood Gas Puncture Site  LT RADIAL 


 


Blood Gas Patient Temperature  98.6 


 


Blood Gas HCO3  24 mmol/L 


 


Blood Gas Base Excess  0.1 mmol/L 


 


Blood Gas Oxygen Saturation  89 % 


 


Arterial Blood pH  7.40 


 


Arterial Blood Partial


Pressure CO2 


  40 mmHG 


 


 


Arterial Blood Partial


Pressure O2 


  63 mmHG 


 


 


Arterial Blood Oxygen Content  20.9 Vol % 


 


Arterial Blood


Carboxyhemoglobin 


  1.6 % 


 


 


Arterial Blood Methemoglobin  1.3 % 


 


Blood Gas Hemoglobin  16.7 G/DL 


 


Oxygen Delivery Device  RA 


 


Blood Gas Inspired Oxygen  21 % 











[facility]


Medical Record Reviewed:  Yes


Supervised Visit with ARTURO:  No


Interpretation(s)





Last Impressions








CT Angiography 10/20/17 0620 Signed





Impressions: 





 Service Date/Time:  Friday, October 20, 2017 07:20 - CONCLUSION:  1. No 

evidence 





 of pulmonary embolism.  2. Scattered ground-glass infiltrates bilaterally 

which 





 are slightly improved compared to March of 2017.  3. Stable mildly prominent 





 precarinal and AP window mediastinal and right hilar lymphadenopathy.      





 Zhen Felton MD 


 


Chest X-Ray 10/20/17 0617 Signed





Impressions: 





 Service Date/Time:  Friday, October 20, 2017 06:24 - CONCLUSION: No acute 





 disease.       Karthik Enriquez MD 








Laboratory Tests








Test


  10/20/17


06:41 10/20/17


07:13


 


White Blood Count 18.9 TH/MM3  


 


Red Blood Count 5.96 MIL/MM3  


 


Hemoglobin 16.9 GM/DL  


 


Hematocrit 50.2 %  


 


Mean Corpuscular Volume 84.3 FL  


 


Mean Corpuscular Hemoglobin 28.3 PG  


 


Mean Corpuscular Hemoglobin


Concent 33.6 % 


  


 


 


Red Cell Distribution Width 16.2 %  


 


Platelet Count 467 TH/MM3  


 


Mean Platelet Volume 7.8 FL  


 


Neutrophils (%) (Auto) 65.6 %  


 


Lymphocytes (%) (Auto) 16.3 %  


 


Monocytes (%) (Auto) 6.8 %  


 


Eosinophils (%) (Auto) 10.6 %  


 


Basophils (%) (Auto) 0.7 %  


 


Neutrophils # (Auto) 12.4 TH/MM3  


 


Lymphocytes # (Auto) 3.1 TH/MM3  


 


Monocytes # (Auto) 1.3 TH/MM3  


 


Eosinophils # (Auto) 2.0 TH/MM3  


 


Basophils # (Auto) 0.1 TH/MM3  


 


CBC Comment DIFF FINAL  


 


Differential Comment   


 


Prothrombin Time 10.9 SEC  


 


Prothromb Time International


Ratio 1.0 RATIO 


  


 


 


Activated Partial


Thromboplast Time 32.9 SEC 


  


 


 


Blood Urea Nitrogen 15 MG/DL  


 


Creatinine 0.93 MG/DL  


 


Random Glucose 96 MG/DL  


 


Total Protein 8.2 GM/DL  


 


Albumin 3.7 GM/DL  


 


Calcium Level 8.9 MG/DL  


 


Alkaline Phosphatase 87 U/L  


 


Aspartate Amino Transf


(AST/SGOT) 51 U/L 


  


 


 


Alanine Aminotransferase


(ALT/SGPT) 116 U/L 


  


 


 


Total Bilirubin 0.6 MG/DL  


 


Sodium Level 137 MEQ/L  


 


Potassium Level 4.3 MEQ/L  


 


Chloride Level 104 MEQ/L  


 


Carbon Dioxide Level 26.0 MEQ/L  


 


Anion Gap 7 MEQ/L  


 


Estimat Glomerular Filtration


Rate 86 ML/MIN 


  


 


 


Troponin I


  LESS THAN 0.02


NG/ML 


 


 


Blood Gas Puncture Site  LT RADIAL 


 


Blood Gas Patient Temperature  98.6 


 


Blood Gas HCO3  24 mmol/L 


 


Blood Gas Base Excess  0.1 mmol/L 


 


Blood Gas Oxygen Saturation  89 % 


 


Arterial Blood pH  7.40 


 


Arterial Blood Partial


Pressure CO2 


  40 mmHG 


 


 


Arterial Blood Partial


Pressure O2 


  63 mmHG 


 


 


Arterial Blood Oxygen Content  20.9 Vol % 


 


Arterial Blood


Carboxyhemoglobin 


  1.6 % 


 


 


Arterial Blood Methemoglobin  1.3 % 


 


Blood Gas Hemoglobin  16.7 G/DL 


 


Oxygen Delivery Device  RA 


 


Blood Gas Inspired Oxygen  21 % 








Differential Diagnosis


Differential diagnosis includes pulmonary embolism, COPD exacerbation, 

bronchitis, pneumonia, congestive heart failure, cardiomyopathy, ACS.


Narrative Course


The patient was initially evaluated by the previous physician, Dr. Reed.  

Please refer to the initial history, physical, diagnostic evaluation, and 

treatment modality plan.  The patient was signed out at 7:15 AM with CT 

pulmonary angiogram pending.  Chest x-ray is unremarkable, no acute changes.  

CT pulmonary angiogram reveals no pulmonary embolism, does have glass cut 

infiltrates which appear improved from previous exam.  Mediastinal 

lymphadenopathy is stable.  No evidence of acute pneumonia.  Patient's ABG did 

reveal a PO2 of 89% on room air.  The patient initially was treated with 

steroids and nebulizers.  Patient was reevaluated at 8 AM.  The patient still 

had mild wheeze, however, he states his symptoms had significantly improved.  

The patient's O2 saturation while sleeping was 80%, while awake and talking 

came up to 92%.  I had a discussion with the patient regarding 23 hour 

observation versus discharge home.  The patient states he "feels great ", would 

prefer to go home.  I will write a tapering dose of prednisone as well as 

Zithromax.  The patient states he has an inhaler and plenty of nebulizers at 

home.  He is advised to follow-up with his primary physician and return if 

symptoms worsen or progress.


Diagnosis





 Primary Impression:  


 COPD with acute exacerbation


Patient Instructions:  General Instructions





***Additional Instruction:  


medications as directed.  Follow-up with a primary physician.  Stop smoking.  

Return if symptoms worsen or progress.


***Med/Other Pt SpecificInfo:  Prescription(s) given


Scripts


Azithromycin (Zithromax Z-Jimmy) 250 Mg Dspk


250 MG PO AS DIRECTED for Infection, #1 DSPK 0 Refills


   500 MG (2 tabs) day 1, then 1 tab days 2-5.


   Prov: Roche,Davonte Z. MD         10/20/17 


Prednisone (Prednisone) 10 Mg Tab


10 MG PO AS DIRECTED for Shortness of Breath, #30 TAB 0 Refills


   Prov: Davonte Roche MD         10/20/17


Disposition:  01 DISCHARGE HOME


Condition:  Stable











Davonte Roche MD Oct 20, 2017 08:10

## 2017-11-07 ENCOUNTER — HOSPITAL ENCOUNTER (OUTPATIENT)
Dept: HOSPITAL 17 - PHEFT | Age: 47
Setting detail: OBSERVATION
LOS: 1 days | Discharge: HOME | End: 2017-11-08
Attending: HOSPITALIST | Admitting: HOSPITALIST
Payer: COMMERCIAL

## 2017-11-07 VITALS
OXYGEN SATURATION: 93 % | RESPIRATION RATE: 20 BRPM | HEART RATE: 114 BPM | DIASTOLIC BLOOD PRESSURE: 77 MMHG | SYSTOLIC BLOOD PRESSURE: 125 MMHG | TEMPERATURE: 98.5 F

## 2017-11-07 VITALS
SYSTOLIC BLOOD PRESSURE: 132 MMHG | HEART RATE: 134 BPM | RESPIRATION RATE: 22 BRPM | OXYGEN SATURATION: 91 % | TEMPERATURE: 100.7 F | DIASTOLIC BLOOD PRESSURE: 80 MMHG

## 2017-11-07 VITALS
RESPIRATION RATE: 24 BRPM | SYSTOLIC BLOOD PRESSURE: 120 MMHG | TEMPERATURE: 99.1 F | OXYGEN SATURATION: 92 % | DIASTOLIC BLOOD PRESSURE: 84 MMHG | HEART RATE: 135 BPM

## 2017-11-07 VITALS
HEART RATE: 125 BPM | OXYGEN SATURATION: 94 % | RESPIRATION RATE: 22 BRPM | DIASTOLIC BLOOD PRESSURE: 80 MMHG | SYSTOLIC BLOOD PRESSURE: 128 MMHG

## 2017-11-07 VITALS — HEART RATE: 113 BPM

## 2017-11-07 VITALS — OXYGEN SATURATION: 97 %

## 2017-11-07 VITALS
SYSTOLIC BLOOD PRESSURE: 140 MMHG | TEMPERATURE: 98.7 F | HEART RATE: 115 BPM | OXYGEN SATURATION: 95 % | DIASTOLIC BLOOD PRESSURE: 78 MMHG | RESPIRATION RATE: 26 BRPM

## 2017-11-07 VITALS — BODY MASS INDEX: 29.25 KG/M2 | WEIGHT: 220.68 LBS | HEIGHT: 73 IN

## 2017-11-07 DIAGNOSIS — R05: ICD-10-CM

## 2017-11-07 DIAGNOSIS — F41.9: ICD-10-CM

## 2017-11-07 DIAGNOSIS — R00.0: ICD-10-CM

## 2017-11-07 DIAGNOSIS — K21.9: ICD-10-CM

## 2017-11-07 DIAGNOSIS — Z79.01: ICD-10-CM

## 2017-11-07 DIAGNOSIS — Z93.3: ICD-10-CM

## 2017-11-07 DIAGNOSIS — R06.03: ICD-10-CM

## 2017-11-07 DIAGNOSIS — Z86.711: ICD-10-CM

## 2017-11-07 DIAGNOSIS — K57.92: ICD-10-CM

## 2017-11-07 DIAGNOSIS — R09.02: ICD-10-CM

## 2017-11-07 DIAGNOSIS — Z87.891: ICD-10-CM

## 2017-11-07 DIAGNOSIS — D72.829: ICD-10-CM

## 2017-11-07 DIAGNOSIS — J44.1: Primary | ICD-10-CM

## 2017-11-07 LAB
ANION GAP SERPL CALC-SCNC: 6 MEQ/L (ref 5–15)
APTT BLD: 31.7 SEC (ref 24.3–30.1)
BASE EXCESS BLD CALC-SCNC: 2 MMOL/L (ref -2–2)
BASOPHILS # BLD AUTO: 0.3 TH/MM3 (ref 0–0.2)
BASOPHILS NFR BLD: 1.8 % (ref 0–2)
BENZODIAZEPINES PNL UR: 89 % (ref 90–100)
BLOOD GAS CARBOXYHEMOGLOBIN: 1.5 % (ref 0–4)
BLOOD GAS HCO3: 26 MMOL/L (ref 22–26)
BLOOD GAS OXYGEN CONTENT: 20.6 VOL % (ref 12–20)
BLOOD GAS PCO2: 37 MMHG (ref 38–42)
BUN SERPL-MCNC: 13 MG/DL (ref 7–18)
CHLORIDE SERPL-SCNC: 105 MEQ/L (ref 98–107)
CRITICAL VALUE: YES
DRAW SITE: (no result)
EOSINOPHIL # BLD: 1 TH/MM3 (ref 0–0.4)
EOSINOPHIL NFR BLD: 5.2 % (ref 0–4)
ERYTHROCYTE [DISTWIDTH] IN BLOOD BY AUTOMATED COUNT: 14.7 % (ref 11.6–17.2)
GFR SERPLBLD BASED ON 1.73 SQ M-ARVRAT: 112 ML/MIN (ref 89–?)
HCO3 BLD-SCNC: 26.8 MEQ/L (ref 21–32)
HCT VFR BLD CALC: 47.7 % (ref 39–51)
HEMO FLAGS: (no result)
INR PPP: 1 RATIO
LYMPHOCYTES # BLD AUTO: 1.5 TH/MM3 (ref 1–4.8)
LYMPHOCYTES NFR BLD AUTO: 7.9 % (ref 9–44)
MCH RBC QN AUTO: 28.9 PG (ref 27–34)
MCHC RBC AUTO-ENTMCNC: 33.7 % (ref 32–36)
MCV RBC AUTO: 85.6 FL (ref 80–100)
METHGB MFR BLDA: 1.2 % (ref 0–2)
MONOCYTES NFR BLD: 6.9 % (ref 0–8)
NEUTROPHILS # BLD AUTO: 14.7 TH/MM3 (ref 1.8–7.7)
NEUTROPHILS NFR BLD AUTO: 78.2 % (ref 16–70)
NUMBER OF ARTERIAL PUNCTURES: 1
O2/TOTAL GAS SETTING VFR VENT: 21 %
OXYGEN DEVICE: (no result)
PLATELET # BLD: 401 TH/MM3 (ref 150–450)
PO2 BLD: 58 MMHG (ref 61–120)
POTASSIUM SERPL-SCNC: 4 MEQ/L (ref 3.5–5.1)
PROTHROMBIN TIME: 10.7 SEC (ref 9.8–11.6)
RBC # BLD AUTO: 5.57 MIL/MM3 (ref 4.5–5.9)
SALICYLATES SERPL-MCNC: 16.6 G/DL (ref 12–16)
SODIUM SERPL-SCNC: 138 MEQ/L (ref 136–145)
STAT: YES
TEMP CORR TO: 98.6
WBC # BLD AUTO: 18.8 TH/MM3 (ref 4–11)

## 2017-11-07 PROCEDURE — 97162 PT EVAL MOD COMPLEX 30 MIN: CPT

## 2017-11-07 PROCEDURE — 36600 WITHDRAWAL OF ARTERIAL BLOOD: CPT

## 2017-11-07 PROCEDURE — G0378 HOSPITAL OBSERVATION PER HR: HCPCS

## 2017-11-07 PROCEDURE — 94640 AIRWAY INHALATION TREATMENT: CPT

## 2017-11-07 PROCEDURE — 85730 THROMBOPLASTIN TIME PARTIAL: CPT

## 2017-11-07 PROCEDURE — 99285 EMERGENCY DEPT VISIT HI MDM: CPT

## 2017-11-07 PROCEDURE — 96361 HYDRATE IV INFUSION ADD-ON: CPT

## 2017-11-07 PROCEDURE — 87040 BLOOD CULTURE FOR BACTERIA: CPT

## 2017-11-07 PROCEDURE — 94664 DEMO&/EVAL PT USE INHALER: CPT

## 2017-11-07 PROCEDURE — 83605 ASSAY OF LACTIC ACID: CPT

## 2017-11-07 PROCEDURE — 85025 COMPLETE CBC W/AUTO DIFF WBC: CPT

## 2017-11-07 PROCEDURE — 80048 BASIC METABOLIC PNL TOTAL CA: CPT

## 2017-11-07 PROCEDURE — 85610 PROTHROMBIN TIME: CPT

## 2017-11-07 PROCEDURE — 71010: CPT

## 2017-11-07 PROCEDURE — 96374 THER/PROPH/DIAG INJ IV PUSH: CPT

## 2017-11-07 PROCEDURE — 82805 BLOOD GASES W/O2 SATURATION: CPT

## 2017-11-07 PROCEDURE — 81001 URINALYSIS AUTO W/SCOPE: CPT

## 2017-11-07 RX ADMIN — IPRATROPIUM BROMIDE AND ALBUTEROL SULFATE SCH AMPULE: .5; 3 SOLUTION RESPIRATORY (INHALATION) at 18:54

## 2017-11-07 RX ADMIN — IPRATROPIUM BROMIDE AND ALBUTEROL SULFATE SCH AMPULE: .5; 3 SOLUTION RESPIRATORY (INHALATION) at 19:13

## 2017-11-07 RX ADMIN — IPRATROPIUM BROMIDE AND ALBUTEROL SULFATE SCH AMPULE: .5; 3 SOLUTION RESPIRATORY (INHALATION) at 19:06

## 2017-11-07 RX ADMIN — IPRATROPIUM BROMIDE SCH MG: 0.5 SOLUTION RESPIRATORY (INHALATION) at 22:28

## 2017-11-07 NOTE — PD
HPI


Chief Complaint:  Respiratory Symptoms


Time Seen by Provider:  18:44


Travel History


International Travel<30 days:  No


Contact w/Intl Traveler<30days:  No


Traveled to known affect area:  No





History of Present Illness


HPI


47-year-old male, with history of COPD, presents to the emergency department 

with complaint of chest tightness, shortness of breath, increased work of 

breathing that started this morning with worsening throughout the day.  He's 

using his albuterol inhaler with no relief of symptoms.  Has history of PE and 

IVC filter.  Says he was here about a week ago with the same symptoms and had a 

CT scan of his chest which was negative for PE; says he was discharged with an 

inhaler, steroids and 5 days of antibiotics.  No known relieving or aggravating 

factors.  Quit smoking cigarettes one month ago.  Denies recent illness to 

include cough, nasal congestion, fevers, vomiting.  Has no medical complaints 

at this time.  No known allergies.  No other modifying factors or associated 

signs and symptoms.





PFSH


Past Medical History


Hx Anticoagulant Therapy:  Yes


Anxiety:  Yes


Heart Rhythm Problems:  Yes (TACHYCARDIA)


Cancer:  No


Cardiovascular Problems:  Yes (CHRONIC TACHYCARDIA)


Congestive Heart Failure:  No


COPD:  Yes


Coronary Artery Disease:  No


Diabetes:  No


Diminished Hearing:  No


Diverticulitis:  Yes


Deep Vein Thrombosis:  Yes (PE-FILTER UPPER LEG )


Endocrine:  No


Gastrointestinal Disorders:  Yes (COLOSTOMY JULY 2016, DIVERTICULITIS, SEPSIS)


GERD:  Yes


Genitourinary:  No


Hypertension:  No


Implanted Vascular Access Dvce:  Yes (FILTER)


Musculoskeletal:  No


Neurologic:  No


Psychiatric:  No


Respiratory:  Yes (COPD)


Immunizations Current:  Yes





Past Surgical History


Abdominal Surgery:  Yes (LIVER BIOPSY, COLOSTOMY)


Appendectomy:  Yes


Body Medical Devices:  UZIEL FILTER


Other Surgery:  Yes (colostomy reversal)





Social History


Alcohol Use:  Yes (OCCASIONAL ON WEEKEND)


Tobacco Use:  No (quit 2 months ago)


Substance Use:  No





Allergies-Medications


(Allergen,Severity, Reaction):  


Coded Allergies:  


     No Known Allergies (Unverified  Allergy, Unknown, 11/7/17)


Reported Meds & Prescriptions





Reported Meds & Active Scripts


Active








Review of Systems


Except as stated in HPI:  all other systems reviewed are Neg





Physical Exam


Narrative


GENERAL: Well-nourished, well-developed  male patient; in acute 

respiratory distress, patient speaking in full sentences with shortness of 

breath noted


SKIN: Warm and dry.


HEAD: Atraumatic. Normocephalic. 


EYES: Pupils equal and round. No scleral icterus. No injection or drainage. 


ENT: Mucosa pink and moist.  Airway patent.  


EARS: Bilateral pinnae and external canals appear within normal limits. 


NECK: Trachea midline.  No lymphadenopathy. 


CARDIOVASCULAR: Tachycardic rate and rhythm.  No murmur appreciated.


RESPIRATORY:  Lungs diminished throughout with some wheezing auscultated to the 

left upper lung. Breath sounds equal bilaterally.  Tachypnea and accessory 

muscle use noted.  No Audible wheezing noted.  


GASTROINTESTINAL: Rounded. 


MUSCULOSKELETAL: No obvious deformities. No clubbing.  No cyanosis.  No edema. 


NEUROLOGICAL: Awake and alert.  Oriented 3.  No obvious cranial nerve 

deficits.  Motor grossly within normal limits. Normal speech. Moves all 

extremities.  5/5 strength to all extremities.


PSYCHIATRIC: Appropriate mood and affect; insight and judgment normal.





Data


Data


Last Documented VS





Vital Signs








  Date Time  Temp Pulse Resp B/P (MAP) Pulse Ox O2 Delivery O2 Flow Rate FiO2


 


11/7/17 21:07  125 22 128/80 (96) 94 Nasal Cannula 3.00 


 


11/7/17 19:29 99.1       








Orders





 Orders


Albuterol-Ipratropium Neb (Duoneb Neb) (11/7/17 18:45)


Methylprednisolone So Succ Inj (Solumedr (11/7/17 18:45)


Arterial Blood Gas (Abg) (11/7/17 19:27)


Chest, Single Ap (11/7/17 19:27)


Basic Metabolic Panel (Bmp) (11/7/17 20:13)


Complete Blood Count With Diff (11/7/17 20:13)


Iv Access Insert/Monitor (11/7/17 20:13)


Sodium Chloride 0.9% Flush (Ns Flush) (11/7/17 20:15)


Lactic Acid Sepsis Protocol (11/7/17 20:30)


Prothrombin Time / Inr (Pt) (11/7/17 20:30)


Act Partial Throm Time (Ptt) (11/7/17 20:30)


Urinalysis - C+S If Indicated (11/7/17 20:30)


Blood Culture (11/7/17 20:30)


Albuterol-Ipratropium Neb (Duoneb Neb) (11/7/17 20:30)


Sodium Chlor 0.9% 1000 Ml Inj (Ns 1000 M (11/7/17 20:30)


Place In Observation (11/7/17 )


Vital Signs (Adult) Q4H (11/7/17 21:25)


Activity Oob With Assistance (11/7/17 21:25)


Cardiac Monitor / Telemetry .CONTINUOUS (11/7/17 21:25)


Intake + Output RIKI.QSHIFT (11/7/17 21:25)


Diet Heart Healthy (11/8/17 Breakfast)


Sodium Chloride 0.9% Flush (Ns Flush) (11/7/17 21:30)


Sodium Chloride 0.9% Flush (Ns Flush) (11/8/17 09:00)


Basic Metabolic Panel (Bmp) (11/8/17 06:00)


Complete Blood Count With Diff (11/8/17 06:00)


Resp Oxygen Maldonado C Titrat 1-4 L (11/7/17 )


Pt Request For Service (11/7/17 21:25)


Case Management Consult (11/7/17 21:25)


Naloxone Inj (Narcan Inj) (11/7/17 21:30)


Ipratropium Neb (Atrovent Neb) (11/7/17 22:00)


Ipratropium Neb (Atrovent Neb) (11/7/17 21:30)


Admit Order (Ed Use Only) (11/7/17 21:43)





Labs





Laboratory Tests








Test


  11/7/17


19:40 11/7/17


20:25 11/7/17


20:30 11/7/17


20:45


 


Blood Gas Puncture Site RT BRACHIAL    


 


Blood Gas Patient Temperature 98.6    


 


Blood Gas HCO3 26 mmol/L    


 


Blood Gas Base Excess 2.0 mmol/L    


 


Blood Gas Oxygen Saturation 89 %    


 


Arterial Blood pH 7.46    


 


Arterial Blood Partial


Pressure CO2 37 mmHG 


  


  


  


 


 


Arterial Blood Partial


Pressure O2 58 mmHG 


  


  


  


 


 


Arterial Blood Oxygen Content 20.6 Vol %    


 


Arterial Blood


Carboxyhemoglobin 1.5 % 


  


  


  


 


 


Arterial Blood Methemoglobin 1.2 %    


 


Blood Gas Hemoglobin 16.6 G/DL    


 


Oxygen Delivery Device ROOM AIR    


 


Blood Gas Inspired Oxygen 21 %    


 


White Blood Count  18.8 TH/MM3   


 


Red Blood Count  5.57 MIL/MM3   


 


Hemoglobin  16.1 GM/DL   


 


Hematocrit  47.7 %   


 


Mean Corpuscular Volume  85.6 FL   


 


Mean Corpuscular Hemoglobin  28.9 PG   


 


Mean Corpuscular Hemoglobin


Concent 


  33.7 % 


  


  


 


 


Red Cell Distribution Width  14.7 %   


 


Platelet Count  401 TH/MM3   


 


Mean Platelet Volume  7.4 FL   


 


Neutrophils (%) (Auto)  78.2 %   


 


Lymphocytes (%) (Auto)  7.9 %   


 


Monocytes (%) (Auto)  6.9 %   


 


Eosinophils (%) (Auto)  5.2 %   


 


Basophils (%) (Auto)  1.8 %   


 


Neutrophils # (Auto)  14.7 TH/MM3   


 


Lymphocytes # (Auto)  1.5 TH/MM3   


 


Monocytes # (Auto)  1.3 TH/MM3   


 


Eosinophils # (Auto)  1.0 TH/MM3   


 


Basophils # (Auto)  0.3 TH/MM3   


 


CBC Comment  DIFF FINAL   


 


Differential Comment     


 


Lactic Acid Level   0.8 mmol/L  


 


Prothrombin Time    10.7 SEC 


 


Prothromb Time International


Ratio 


  


  


  1.0 RATIO 


 


 


Activated Partial


Thromboplast Time 


  


  


  31.7 SEC 


 


 


Blood Urea Nitrogen    13 MG/DL 


 


Creatinine    0.74 MG/DL 


 


Random Glucose    120 MG/DL 


 


Calcium Level    8.7 MG/DL 


 


Sodium Level    138 MEQ/L 


 


Potassium Level    4.0 MEQ/L 


 


Chloride Level    105 MEQ/L 


 


Carbon Dioxide Level    26.8 MEQ/L 


 


Anion Gap    6 MEQ/L 


 


Estimat Glomerular Filtration


Rate 


  


  


  112 ML/MIN 


 











[facility]


Medical Decision Making


Medical Screen Exam Complete:  Yes


Emergency Medical Condition:  Yes


Medical Record Reviewed:  Yes


Differential Diagnosis


COPD exacerbation, respiratory distress, pneumonia, PE


Narrative Course


47-year-old male in respiratory distress with tachypnea and work of breathing.  

Respiratory therapist called stat to bedside.   DuoNeb 3 administered.  Solu-

Medrol 125 mg IM ordered.  History of COPD.  the patient was seen here on 

October 20 and had CTA which ruled out PE; cardiac workup with troponin less 

than 0.02 and BNP 13.


1929: Patient still having work of breathing, diminished lung sounds, purse 

lipped breathing, and oxygen saturation 88-90% on room air after 3 DuoNeb 

treatments.  Dr. Reed evaluated the patient and recommended ABG on room air 

and chest x-ray.  Orders entered.  Dr. Reed discussed admission and the 

patient says he will refuse admission.  At this time, secondary to patient 

refusing admission, ordering IV and labs has been put on hold.  


1959:  Patient placed on 3 L nasal cannula secondary to ABG pH 7.45, pCO2 36.7, 

pO2 57.6; FO2 88%.  Dr. Reed wants to have the patient admitted.  I 

discussed this with the patient and the need to start an IV and draw labs and 

the patient is continuing to refuse admission.  I discussed risks of leaving 

[facility] and the patient asked for some time to make some phone calls.  


2014:  Patient has agreed to admission.  IV site obtained.  Patient meets 

sepsis protocol.  CBC, BMP, lactic acid, blood cultures, normal saline bolus, 

urinalysis ordered.  DuoNeb 1 ordered.


2034:  Chest x-ray no acute disease.  


2045:  Call placed to [facility] for patient admission.


2100:  Dr. Cervantes assumed patient care at this time.  See his note for final 

patient disposition.





Physician Communication


Physician Communication


[facility]





Diagnosis





 Primary Impression:  


 COPD with acute exacerbation


 Additional Impression:  


 Hypoxia





Admitting Information


Admitting Physician Requests:  Admit


Scripts


Prednisone (21) 5 mg tab Dose Pack (Prednisone (21) 5 mg tab Dose Pack) 5 Mg 

Dspk


5 MG PO AS DIRECTED for Inflammation, #1 DSPK 0 Refills


   Prov: Jimenez Hopkins MD         11/8/17 


Ipratropium Neb (Ipratropium Neb) 0.5 Mg/2.5 Ml Amp


0.5 MG NEB Q2HR NEB Y for wheezing for 30 Days, ML


   Prov: Jimenez Hopkins MD         11/8/17 


Fluticasone-Vilanterol Inh (Breo Ellipta Inh) 100-25 Mcg/Act Inh


1 PUFF INH DAILY for copd for 30 Days, #1 INHALER 3 Refills


   Use daily at the same time.


   Prov: Jimenez Hopkins MD         11/8/17











Melvi Alatorre Nov 7, 2017 19:06

## 2017-11-07 NOTE — PD
Physical Exam


Time Seen by Provider:  19:26


Narrative


The patient was transferred to me by the physician assistant to admit the 

patient.  The patient has hypoxemia.  Initially he refused admission but now he 

agrees to admission.





Data


Data


Last Documented VS





Vital Signs








  Date Time  Temp Pulse Resp B/P (MAP) Pulse Ox O2 Delivery O2 Flow Rate FiO2


 


11/7/17 21:07  125 22 128/80 (96) 94 Nasal Cannula 3.00 


 


11/7/17 19:29 99.1       








Orders





 Orders


Albuterol-Ipratropium Neb (Duoneb Neb) (11/7/17 18:45)


Methylprednisolone So Succ Inj (Solumedr (11/7/17 18:45)


Arterial Blood Gas (Abg) (11/7/17 19:27)


Chest, Single Ap (11/7/17 19:27)


Basic Metabolic Panel (Bmp) (11/7/17 20:13)


Complete Blood Count With Diff (11/7/17 20:13)


Iv Access Insert/Monitor (11/7/17 20:13)


Sodium Chloride 0.9% Flush (Ns Flush) (11/7/17 20:15)


Lactic Acid Sepsis Protocol (11/7/17 20:30)


Prothrombin Time / Inr (Pt) (11/7/17 20:30)


Act Partial Throm Time (Ptt) (11/7/17 20:30)


Urinalysis - C+S If Indicated (11/7/17 20:30)


Blood Culture (11/7/17 20:30)


Albuterol-Ipratropium Neb (Duoneb Neb) (11/7/17 20:30)


Sodium Chlor 0.9% 1000 Ml Inj (Ns 1000 M (11/7/17 20:30)


Place In Observation (11/7/17 )


Vital Signs (Adult) Q4H (11/7/17 21:25)


Activity Oob With Assistance (11/7/17 21:25)


Cardiac Monitor / Telemetry .CONTINUOUS (11/7/17 21:25)


Intake + Output RIKI.QSHIFT (11/7/17 21:25)


Diet Heart Healthy (11/8/17 Breakfast)


Sodium Chloride 0.9% Flush (Ns Flush) (11/7/17 21:30)


Sodium Chloride 0.9% Flush (Ns Flush) (11/8/17 09:00)


Basic Metabolic Panel (Bmp) (11/8/17 06:00)


Complete Blood Count With Diff (11/8/17 06:00)


Resp Oxygen Maldonado C Titrat 1-4 L (11/7/17 )


Pt Request For Service (11/7/17 21:25)


Case Management Consult (11/7/17 21:25)


Naloxone Inj (Narcan Inj) (11/7/17 21:30)


Ipratropium Neb (Atrovent Neb) (11/7/17 22:00)


Ipratropium Neb (Atrovent Neb) (11/7/17 21:30)





Labs





Laboratory Tests








Test


  11/7/17


19:40 11/7/17


20:25 11/7/17


20:30 11/7/17


20:45


 


Blood Gas Puncture Site RT BRACHIAL    


 


Blood Gas Patient Temperature 98.6    


 


Blood Gas HCO3 26 mmol/L    


 


Blood Gas Base Excess 2.0 mmol/L    


 


Blood Gas Oxygen Saturation 89 %    


 


Arterial Blood pH 7.46    


 


Arterial Blood Partial


Pressure CO2 37 mmHG 


  


  


  


 


 


Arterial Blood Partial


Pressure O2 58 mmHG 


  


  


  


 


 


Arterial Blood Oxygen Content 20.6 Vol %    


 


Arterial Blood


Carboxyhemoglobin 1.5 % 


  


  


  


 


 


Arterial Blood Methemoglobin 1.2 %    


 


Blood Gas Hemoglobin 16.6 G/DL    


 


Oxygen Delivery Device ROOM AIR    


 


Blood Gas Inspired Oxygen 21 %    


 


White Blood Count  18.8 TH/MM3   


 


Red Blood Count  5.57 MIL/MM3   


 


Hemoglobin  16.1 GM/DL   


 


Hematocrit  47.7 %   


 


Mean Corpuscular Volume  85.6 FL   


 


Mean Corpuscular Hemoglobin  28.9 PG   


 


Mean Corpuscular Hemoglobin


Concent 


  33.7 % 


  


  


 


 


Red Cell Distribution Width  14.7 %   


 


Platelet Count  401 TH/MM3   


 


Mean Platelet Volume  7.4 FL   


 


Neutrophils (%) (Auto)  78.2 %   


 


Lymphocytes (%) (Auto)  7.9 %   


 


Monocytes (%) (Auto)  6.9 %   


 


Eosinophils (%) (Auto)  5.2 %   


 


Basophils (%) (Auto)  1.8 %   


 


Neutrophils # (Auto)  14.7 TH/MM3   


 


Lymphocytes # (Auto)  1.5 TH/MM3   


 


Monocytes # (Auto)  1.3 TH/MM3   


 


Eosinophils # (Auto)  1.0 TH/MM3   


 


Basophils # (Auto)  0.3 TH/MM3   


 


CBC Comment  DIFF FINAL   


 


Differential Comment     


 


Lactic Acid Level   0.8 mmol/L  


 


Prothrombin Time    10.7 SEC 


 


Prothromb Time International


Ratio 


  


  


  1.0 RATIO 


 


 


Activated Partial


Thromboplast Time 


  


  


  31.7 SEC 


 


 


Blood Urea Nitrogen    13 MG/DL 


 


Creatinine    0.74 MG/DL 


 


Random Glucose    120 MG/DL 


 


Calcium Level    8.7 MG/DL 


 


Sodium Level    138 MEQ/L 


 


Potassium Level    4.0 MEQ/L 


 


Chloride Level    105 MEQ/L 


 


Carbon Dioxide Level    26.8 MEQ/L 


 


Anion Gap    6 MEQ/L 


 


Estimat Glomerular Filtration


Rate 


  


  


  112 ML/MIN 


 











MDM


Medical Record Reviewed:  Yes


Supervised Visit with ARTURO:  Yes


Interpretation(s)


The blood gases show pH 7.46, CO2 37, PO2 58 with O2 sat 88.6%.  The chest x-

ray shows no acute disease.  The white count is 18,800.


Differential Diagnosis


Sepsis, pneumonia, hypoxemia, COPD with acute exacerbation


Narrative Course


The patient has COPD with acute exacerbation/hypoxemia.





Plan: The patient be admitted to Dr. Christensen of the [facility] service.


Diagnosis





 Primary Impression:  


 COPD with acute exacerbation


 Additional Impression:  


 Hypoxia











Killian Reed MD Nov 7, 2017 21:37

## 2017-11-07 NOTE — RADRPT
EXAM DATE/TIME:  11/07/2017 19:39 

 

[facility] COMPARISON:     

CHEST SINGLE AP, October 20, 2017, 6:24.

 

                     

INDICATIONS :     

Short of breath and cough since this morning.

                     

 

MEDICAL HISTORY :     

None.          

 

SURGICAL HISTORY :     

None.   

 

ENCOUNTER:     

Initial                                        

 

ACUITY:     

1 day      

 

PAIN SCORE:     

2/10

 

LOCATION:     

Bilateral chest 

 

FINDINGS:     

A single view of the chest demonstrates the lungs to be symmetrically aerated without evidence of mas
s, infiltrate or effusion.  The cardiomediastinal contours are unremarkable.  Osseous structures are 
intact.

 

CONCLUSION:     No acute disease.  

 

 

 

 Christian Griffith Jr., MD on November 07, 2017 at 20:28           

Board Certified Radiologist.

 This report was verified electronically.

## 2017-11-08 VITALS
DIASTOLIC BLOOD PRESSURE: 82 MMHG | OXYGEN SATURATION: 91 % | HEART RATE: 103 BPM | TEMPERATURE: 97.3 F | SYSTOLIC BLOOD PRESSURE: 129 MMHG | RESPIRATION RATE: 24 BRPM

## 2017-11-08 VITALS
TEMPERATURE: 97.6 F | RESPIRATION RATE: 20 BRPM | OXYGEN SATURATION: 93 % | DIASTOLIC BLOOD PRESSURE: 75 MMHG | HEART RATE: 97 BPM | SYSTOLIC BLOOD PRESSURE: 143 MMHG

## 2017-11-08 VITALS — OXYGEN SATURATION: 93 %

## 2017-11-08 LAB
ANION GAP SERPL CALC-SCNC: 6 MEQ/L (ref 5–15)
BASOPHILS # BLD AUTO: 0 TH/MM3 (ref 0–0.2)
BASOPHILS NFR BLD: 0.3 % (ref 0–2)
BUN SERPL-MCNC: 12 MG/DL (ref 7–18)
CHLORIDE SERPL-SCNC: 106 MEQ/L (ref 98–107)
COLOR UR: YELLOW
COMMENT (UR): (no result)
CULTURE IF INDICATED: (no result)
EOSINOPHIL # BLD: 0 TH/MM3 (ref 0–0.4)
EOSINOPHIL NFR BLD: 0.1 % (ref 0–4)
ERYTHROCYTE [DISTWIDTH] IN BLOOD BY AUTOMATED COUNT: 14.8 % (ref 11.6–17.2)
GFR SERPLBLD BASED ON 1.73 SQ M-ARVRAT: 97 ML/MIN (ref 89–?)
GLUCOSE UR STRIP-MCNC: 250 MG/DL
HCO3 BLD-SCNC: 25.8 MEQ/L (ref 21–32)
HCT VFR BLD CALC: 46.5 % (ref 39–51)
HEMO FLAGS: (no result)
HGB UR QL STRIP: (no result)
KETONES UR STRIP-MCNC: (no result) MG/DL
LYMPHOCYTES # BLD AUTO: 0.8 TH/MM3 (ref 1–4.8)
LYMPHOCYTES NFR BLD AUTO: 6.2 % (ref 9–44)
MCH RBC QN AUTO: 28.6 PG (ref 27–34)
MCHC RBC AUTO-ENTMCNC: 33.4 % (ref 32–36)
MCV RBC AUTO: 85.6 FL (ref 80–100)
METHOD OF COLLECTION: (no result)
MONOCYTES NFR BLD: 1.3 % (ref 0–8)
NEUTROPHILS # BLD AUTO: 12.2 TH/MM3 (ref 1.8–7.7)
NEUTROPHILS NFR BLD AUTO: 92.1 % (ref 16–70)
NITRITE UR QL STRIP: (no result)
PLATELET # BLD: 420 TH/MM3 (ref 150–450)
POTASSIUM SERPL-SCNC: 4 MEQ/L (ref 3.5–5.1)
RBC # BLD AUTO: 5.44 MIL/MM3 (ref 4.5–5.9)
RBC #/AREA URNS HPF: (no result) /HPF (ref 0–3)
SODIUM SERPL-SCNC: 138 MEQ/L (ref 136–145)
SP GR UR STRIP: 1.02 (ref 1–1.03)
SQUAMOUS #/AREA URNS HPF: (no result) /HPF (ref 0–5)
WBC # BLD AUTO: 13.2 TH/MM3 (ref 4–11)

## 2017-11-08 RX ADMIN — IPRATROPIUM BROMIDE SCH MG: 0.5 SOLUTION RESPIRATORY (INHALATION) at 03:27

## 2017-11-08 RX ADMIN — IPRATROPIUM BROMIDE SCH MG: 0.5 SOLUTION RESPIRATORY (INHALATION) at 09:20
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
## 2018-04-28 ENCOUNTER — HOSPITAL ENCOUNTER (EMERGENCY)
Dept: HOSPITAL 17 - PHED | Age: 48
Discharge: HOME | End: 2018-04-28
Payer: SELF-PAY

## 2018-04-28 VITALS — BODY MASS INDEX: 29.34 KG/M2 | HEIGHT: 73 IN | WEIGHT: 221.34 LBS

## 2018-04-28 VITALS
OXYGEN SATURATION: 93 % | SYSTOLIC BLOOD PRESSURE: 135 MMHG | HEART RATE: 103 BPM | DIASTOLIC BLOOD PRESSURE: 82 MMHG | TEMPERATURE: 97.5 F

## 2018-04-28 VITALS — OXYGEN SATURATION: 98 % | RESPIRATION RATE: 22 BRPM

## 2018-04-28 VITALS — OXYGEN SATURATION: 98 %

## 2018-04-28 VITALS
RESPIRATION RATE: 20 BRPM | SYSTOLIC BLOOD PRESSURE: 114 MMHG | DIASTOLIC BLOOD PRESSURE: 81 MMHG | OXYGEN SATURATION: 95 % | HEART RATE: 97 BPM

## 2018-04-28 VITALS — DIASTOLIC BLOOD PRESSURE: 88 MMHG | SYSTOLIC BLOOD PRESSURE: 127 MMHG

## 2018-04-28 DIAGNOSIS — Z87.891: ICD-10-CM

## 2018-04-28 DIAGNOSIS — Z86.718: ICD-10-CM

## 2018-04-28 DIAGNOSIS — F41.9: ICD-10-CM

## 2018-04-28 DIAGNOSIS — J44.1: Primary | ICD-10-CM

## 2018-04-28 DIAGNOSIS — K21.9: ICD-10-CM

## 2018-04-28 PROCEDURE — 87804 INFLUENZA ASSAY W/OPTIC: CPT

## 2018-04-28 PROCEDURE — 94640 AIRWAY INHALATION TREATMENT: CPT

## 2018-04-28 PROCEDURE — 96374 THER/PROPH/DIAG INJ IV PUSH: CPT

## 2018-04-28 PROCEDURE — 99284 EMERGENCY DEPT VISIT MOD MDM: CPT

## 2018-04-28 PROCEDURE — 94664 DEMO&/EVAL PT USE INHALER: CPT

## 2018-04-28 RX ADMIN — IPRATROPIUM BROMIDE AND ALBUTEROL SULFATE SCH AMPULE: .5; 3 SOLUTION RESPIRATORY (INHALATION) at 02:29

## 2018-04-28 RX ADMIN — IPRATROPIUM BROMIDE AND ALBUTEROL SULFATE SCH AMPULE: .5; 3 SOLUTION RESPIRATORY (INHALATION) at 02:08

## 2018-04-28 RX ADMIN — IPRATROPIUM BROMIDE AND ALBUTEROL SULFATE SCH AMPULE: .5; 3 SOLUTION RESPIRATORY (INHALATION) at 02:17
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
## 2018-05-21 ENCOUNTER — HOSPITAL ENCOUNTER (EMERGENCY)
Dept: HOSPITAL 17 - PHEFT | Age: 48
Discharge: HOME | End: 2018-05-21
Payer: COMMERCIAL

## 2018-05-21 VITALS
HEART RATE: 108 BPM | SYSTOLIC BLOOD PRESSURE: 135 MMHG | DIASTOLIC BLOOD PRESSURE: 79 MMHG | OXYGEN SATURATION: 96 % | RESPIRATION RATE: 20 BRPM

## 2018-05-21 VITALS
RESPIRATION RATE: 20 BRPM | SYSTOLIC BLOOD PRESSURE: 116 MMHG | OXYGEN SATURATION: 93 % | TEMPERATURE: 98.2 F | DIASTOLIC BLOOD PRESSURE: 83 MMHG | HEART RATE: 116 BPM

## 2018-05-21 VITALS — WEIGHT: 220.02 LBS | BODY MASS INDEX: 28.24 KG/M2 | HEIGHT: 74 IN

## 2018-05-21 VITALS — SYSTOLIC BLOOD PRESSURE: 135 MMHG | DIASTOLIC BLOOD PRESSURE: 99 MMHG

## 2018-05-21 DIAGNOSIS — Z86.718: ICD-10-CM

## 2018-05-21 DIAGNOSIS — F41.9: ICD-10-CM

## 2018-05-21 DIAGNOSIS — J44.1: Primary | ICD-10-CM

## 2018-05-21 DIAGNOSIS — K21.9: ICD-10-CM

## 2018-05-21 DIAGNOSIS — F17.210: ICD-10-CM

## 2018-05-21 DIAGNOSIS — R94.31: ICD-10-CM

## 2018-05-21 PROCEDURE — 93005 ELECTROCARDIOGRAM TRACING: CPT

## 2018-05-21 PROCEDURE — 94664 DEMO&/EVAL PT USE INHALER: CPT

## 2018-05-21 PROCEDURE — 99283 EMERGENCY DEPT VISIT LOW MDM: CPT

## 2018-05-21 NOTE — PD
HPI


Chief Complaint:  Respiratory Symptoms


Time Seen by Provider:  11:55


Travel History


International Travel<30 days:  No


Contact w/Intl Traveler<30days:  No


Traveled to known affect area:  No





History of Present Illness


HPI


47-year-old male with a history of frequent COPD exacerbations presents 

emergency department for evaluation of increased shortness of breath and dry, 

hacking cough that has been present since this weekend.  Says that he ran out 

of his albuterol nebulizer medication Wednesday and began having increased 

shortness of breath over the weekend.  Says he has a history of frequent COPD 

exacerbations decided to come in today to prevent worsening of his symptoms.  

He denies any fevers or chills.  He has not seen a pulmonologist with primary 

care physician regarding his symptoms.  He denies fever, chills, chest pain, 

back pain, leg swelling.  Says he would like a refill of his medication, 

albuterol.





PFSH


Past Medical History


Hx Anticoagulant Therapy:  Yes


Asthma:  No


Anxiety:  Yes


Heart Rhythm Problems:  No


Cancer:  No


Cardiovascular Problems:  Yes


High Cholesterol:  No


Chest Pain:  No


Congestive Heart Failure:  No


COPD:  Yes


Coronary Artery Disease:  No


Diabetes:  No


Diminished Hearing:  No


Diverticulitis:  Yes


Deep Vein Thrombosis:  Yes (PE-FILTER UPPER LEG )


Endocrine:  No


Gastrointestinal Disorders:  Yes (COLOSTOMY JULY 2016, DIVERTICULITIS, SEPSIS)


GERD:  Yes


Genitourinary:  No


Hiatal Hernia:  No


Hypertension:  No


Immune Disorder:  No


Implanted Vascular Access Dvce:  Yes (FILTER)


Musculoskeletal:  No


Neurologic:  No


Psychiatric:  No


Reproductive:  No


Respiratory:  Yes (copd)


Immunizations Current:  Yes


Sleep Apnea:  No


Ulcer:  No


Tetanus Vaccination:  Unknown


Influenza Vaccination:  Yes





Past Surgical History


Abdominal Surgery:  Yes (diverticulitis, colostomy and reversible, appendectomy 

hernia)


AICD:  No


Appendectomy:  Yes


Arteriovenous Shunt:  No


Body Medical Devices:  greenfiled filter


Cardiac Surgery:  No


Ear Surgery:  No


Endocrine Surgery:  No


Eye Surgery:  No


Genitourinary Surgery:  No


Gynecologic Surgery:  No


Insulin Pump:  No


Joint Replacement:  No


Neurologic Surgery:  No


Oral Surgery:  No


Pacemaker:  No


Thoracic Surgery:  No


Other Surgery:  Yes (colostomy reversal)





Social History


Alcohol Use:  Yes (OCCASIONAL ON WEEKEND)


Tobacco Use:  Yes (1 pk)


Substance Use:  No





Allergies-Medications


(Allergen,Severity, Reaction):  


Coded Allergies:  


     No Known Allergies (Unverified  Allergy, Unknown, 5/21/18)


Reported Meds & Prescriptions





Reported Meds & Active Scripts


Active


Albuterol Neb (Albuterol Sulfate) 2.5 Mg/3 Ml Neb 2.5 Mg NEB Q4HR NEB PRN


Medrol Dosepak (Methylprednisolone) 4 Mg Dspk 4 Mg PO AS DIRECTED


     Per Pharmacist direction


Albuterol Neb (Albuterol Sulfate) 2.5 Mg/3 Ml Neb 2.5 Mg NEB Q4HR NEB


     While awake


Ipratropium Neb (Ipratropium Bromide) 0.5 Mg/2.5 Ml Amp 0.5 Mg NEB Q2HR NEB PRN 

30 Days


Breo Ellipta Inh (Fluticasone/Vilanterol) 100-25 Mcg/Act Inh 1 Puff INH DAILY 

30 Days


     Use daily at the same time.


Reported


Ventolin Hfa 18 GM Inh (Albuterol Sulfate) 90 Mcg/Act Aer 1 Puff INH Q4H PRN








Review of Systems


Except as stated in HPI:  all other systems reviewed are Neg





Physical Exam


Narrative


GENERAL: Well-nourished, well-developed patient, in NAD


SKIN: Focused skin assessment warm/dry. No rashes or lesions.


HEAD: Normocephalic. Atraumatic.


EYES: No scleral icterus. No injection or drainage.  PERRLA, EOMI


THROAT: No pharyngeal injection, exudates, or tonsillar hypertrophy. Airway is 

patent.


NECK: Supple, trachea midline. No JVD or lymphadenopathy. No meningismus.


CARDIOVASCULAR: Regular rate and rhythm without murmurs, gallops, or rubs. 


RESPIRATORY: Breath sounds equal bilaterally. No accessory muscle use. No rales 

or rhonchi.  Slight wheeze present left lung fields


MUSCULOSKELETAL: No cyanosis, or edema. 


BACK: Nontender without obvious deformity. No CVA tenderness.





Data


Data


Last Documented VS





Vital Signs








  Date Time  Temp Pulse Resp B/P (MAP) Pulse Ox O2 Delivery O2 Flow Rate FiO2


 


5/21/18 12:43  98 20 135/99 (111) 96   


 


5/21/18 11:25      Room Air  


 


5/21/18 10:33 98.2       








Orders





 Orders


Prednisone (Deltasone) (5/21/18 12:00)


Albuterol-Ipratropium Neb (Duoneb Neb) (5/21/18 12:00)


Ed Discharge Order (5/21/18 12:35)








MDM


Medical Decision Making


Medical Screen Exam Complete:  Yes


Emergency Medical Condition:  Yes


Differential Diagnosis


Pneumonia, COPD exacerbation, upper respiratory infection, viral


Narrative Course


47 male presents emergency department for an apparent COPD exacerbation that 

started this weekend.  Patient states he ran out of his albuterol nebulizer 

medication and started developing symptoms this weekend.  Says that he wants to 

prevent any further worsening of his symptoms are decided to come to emergency 

department today.


Vital signs are stable.


His exam findings demonstrate slight wheeze on left lung fields without rales 

or rhonchi.


Review review of the EMR demonstrates patient has had multiple exacerbations 

this year alone.  He does not follow pulmonology or primary care physician 

regarding his symptoms.  Says that he runs out of his medications because he 

does not follow primary care physician.


Prednisone and DuoNeb administered emergency room.


Patient will be discharged with Medrol Dosepak and albuterol nebulized 

medication.


Strongly advised him to follow with pulmonologist to prevent further 

complications.  The patient does continue to smoke and he is aware that this 

will worsen his symptoms.





Diagnosis





 Primary Impression:  


 COPD with acute exacerbation


Referrals:  


Primary Care Physician





Pulmonologist





***Additional Instructions:  


It is imperative that he follow-up with a primary care physician for treatment 

and evaluation of irregular care.


Consider follow-up with a pulmonologist to better maintain your COPD.


Scripts


Albuterol Neb (Albuterol Neb) 2.5 Mg/3 Ml Neb


2.5 MG NEB Q4HR NEB Y for SHORTNESS OF BREATH, #60 NEBULE 0 Refills


   Prov: Ryley Barcenas MD         5/21/18 


Methylprednisolone Dosepak (Medrol Dosepak) 4 Mg Dspk


4 MG PO AS DIRECTED, #1 DSPK 0 Refills


   Per Pharmacist direction


   Prov: Ryley Barcenas MD         5/21/18


Disposition:  01 DISCHARGE HOME


Condition:  Stable











Rhiannon Almaguer May 21, 2018 12:16

## 2018-05-22 NOTE — EKG
Date Performed: 05/21/2018       Time Performed: 10:50:39

 

PTAGE:      47 years

 

EKG:      SINUS TACHYCARDIA POSSIBLE RIGHT VENTRICULAR CONDUCTION DELAY ABNORMAL RHYTHM ECG

 

PREVIOUS TRACING       : 03/25/2017 09.32 Since the previous tracing, no significant change noted

 

DOCTOR:   Jorge Alberto Gifford  Interpretating Date/Time  05/22/2018 17:04:09

## 2018-06-20 ENCOUNTER — HOSPITAL ENCOUNTER (EMERGENCY)
Dept: HOSPITAL 17 - PHED | Age: 48
Discharge: HOME | End: 2018-06-20
Payer: COMMERCIAL

## 2018-06-20 VITALS
RESPIRATION RATE: 20 BRPM | HEART RATE: 105 BPM | SYSTOLIC BLOOD PRESSURE: 134 MMHG | OXYGEN SATURATION: 95 % | DIASTOLIC BLOOD PRESSURE: 73 MMHG

## 2018-06-20 VITALS
OXYGEN SATURATION: 89 % | RESPIRATION RATE: 18 BRPM | HEART RATE: 132 BPM | TEMPERATURE: 97.8 F | SYSTOLIC BLOOD PRESSURE: 116 MMHG | DIASTOLIC BLOOD PRESSURE: 94 MMHG

## 2018-06-20 VITALS — WEIGHT: 223.77 LBS | BODY MASS INDEX: 29.66 KG/M2 | HEIGHT: 73 IN

## 2018-06-20 VITALS — OXYGEN SATURATION: 90 %

## 2018-06-20 DIAGNOSIS — R00.0: ICD-10-CM

## 2018-06-20 DIAGNOSIS — R07.89: ICD-10-CM

## 2018-06-20 DIAGNOSIS — Z72.0: ICD-10-CM

## 2018-06-20 DIAGNOSIS — J44.1: Primary | ICD-10-CM

## 2018-06-20 LAB
ALBUMIN SERPL-MCNC: 3.5 GM/DL (ref 3.4–5)
ALP SERPL-CCNC: 69 U/L (ref 45–117)
ALT SERPL-CCNC: 143 U/L (ref 12–78)
AST SERPL-CCNC: 56 U/L (ref 15–37)
BASOPHILS # BLD AUTO: 0.1 TH/MM3 (ref 0–0.2)
BASOPHILS NFR BLD: 0.6 % (ref 0–2)
BILIRUB SERPL-MCNC: 0.2 MG/DL (ref 0.2–1)
BUN SERPL-MCNC: 21 MG/DL (ref 7–18)
CALCIUM SERPL-MCNC: 8.6 MG/DL (ref 8.5–10.1)
CHLORIDE SERPL-SCNC: 108 MEQ/L (ref 98–107)
CREAT SERPL-MCNC: 0.83 MG/DL (ref 0.6–1.3)
EOSINOPHIL # BLD: 0.8 TH/MM3 (ref 0–0.4)
EOSINOPHIL NFR BLD: 6.3 % (ref 0–4)
ERYTHROCYTE [DISTWIDTH] IN BLOOD BY AUTOMATED COUNT: 13.5 % (ref 11.6–17.2)
GFR SERPLBLD BASED ON 1.73 SQ M-ARVRAT: 98 ML/MIN (ref 89–?)
GLUCOSE SERPL-MCNC: 147 MG/DL (ref 74–106)
HCO3 BLD-SCNC: 23.8 MEQ/L (ref 21–32)
HCT VFR BLD CALC: 47.7 % (ref 39–51)
HGB BLD-MCNC: 16.1 GM/DL (ref 13–17)
LYMPHOCYTES # BLD AUTO: 2.7 TH/MM3 (ref 1–4.8)
LYMPHOCYTES NFR BLD AUTO: 21.3 % (ref 9–44)
MAGNESIUM SERPL-MCNC: 2.1 MG/DL (ref 1.5–2.5)
MCH RBC QN AUTO: 29.6 PG (ref 27–34)
MCHC RBC AUTO-ENTMCNC: 33.6 % (ref 32–36)
MCV RBC AUTO: 87.9 FL (ref 80–100)
MONOCYTE #: 0.9 TH/MM3 (ref 0–0.9)
MONOCYTES NFR BLD: 7.5 % (ref 0–8)
NEUTROPHILS # BLD AUTO: 8 TH/MM3 (ref 1.8–7.7)
NEUTROPHILS NFR BLD AUTO: 64.3 % (ref 16–70)
PLATELET # BLD: 426 TH/MM3 (ref 150–450)
PMV BLD AUTO: 7.4 FL (ref 7–11)
PROT SERPL-MCNC: 7.3 GM/DL (ref 6.4–8.2)
RBC # BLD AUTO: 5.43 MIL/MM3 (ref 4.5–5.9)
SODIUM SERPL-SCNC: 140 MEQ/L (ref 136–145)
TROPONIN I SERPL-MCNC: (no result) NG/ML (ref 0.02–0.05)
WBC # BLD AUTO: 12.5 TH/MM3 (ref 4–11)

## 2018-06-20 PROCEDURE — 71045 X-RAY EXAM CHEST 1 VIEW: CPT

## 2018-06-20 PROCEDURE — 99285 EMERGENCY DEPT VISIT HI MDM: CPT

## 2018-06-20 PROCEDURE — 82550 ASSAY OF CK (CPK): CPT

## 2018-06-20 PROCEDURE — 94664 DEMO&/EVAL PT USE INHALER: CPT

## 2018-06-20 PROCEDURE — 94640 AIRWAY INHALATION TREATMENT: CPT

## 2018-06-20 PROCEDURE — 96374 THER/PROPH/DIAG INJ IV PUSH: CPT

## 2018-06-20 PROCEDURE — 83735 ASSAY OF MAGNESIUM: CPT

## 2018-06-20 PROCEDURE — 83880 ASSAY OF NATRIURETIC PEPTIDE: CPT

## 2018-06-20 PROCEDURE — 80053 COMPREHEN METABOLIC PANEL: CPT

## 2018-06-20 PROCEDURE — 85025 COMPLETE CBC W/AUTO DIFF WBC: CPT

## 2018-06-20 PROCEDURE — 84484 ASSAY OF TROPONIN QUANT: CPT

## 2018-06-20 PROCEDURE — 93005 ELECTROCARDIOGRAM TRACING: CPT

## 2018-06-20 PROCEDURE — 96361 HYDRATE IV INFUSION ADD-ON: CPT

## 2018-06-20 RX ADMIN — IPRATROPIUM BROMIDE AND ALBUTEROL SULFATE SCH AMPULE: .5; 3 SOLUTION RESPIRATORY (INHALATION) at 18:33

## 2018-06-20 NOTE — RADRPT
EXAM DATE:  6/20/2018 6:43 PM EDT

AGE/SEX:        47 years / Male



INDICATIONS:  Shortness of breath starting today



CLINICAL DATA:  This is the patient's initial encounter. Patient reports that signs and symptoms have
 been present for 1 day and indicates a pain score of 0/10. 

                                                                          

MEDICAL/SURGICAL HISTORY:       Chronic obstructive pulmonary disease. None.



COMPARISON:      PO, CHEST SINGLE AP, 11/7/2017.  . 





FINDINGS:  

A single AP view of the chest demonstrates diminished lung volumes without evidence of mass, infiltra
te or effusion.  The cardiomediastinal contours are unremarkable.  Osseous structures are intact.  





CONCLUSION: 

Diminished lung volumes without infiltrate.



Electronically signed by: Karthik Enriquez MD  6/20/2018 6:46 PM EDT

## 2018-06-20 NOTE — PD
HPI


Chief Complaint:  Respiratory Symptoms


Time Seen by Provider:  18:24


Travel History


International Travel<30 days:  No


Contact w/Intl Traveler<30days:  No


Traveled to known affect area:  No





History of Present Illness


HPI


The patient is a 47-year-old  male who presents to the emergency 

department for shortness of breath and chest tightness.  The patient states his 

symptoms started earlier today while at work.  The patient states there is a 

fire pit and there was wind blowing debris his way.  The patient does have a 

history of COPD with previous exacerbations.  The patient does note he has been 

wheezing, use of breathing treatments at home with mild relief, however, he 

persisted with wheezing, coughing, and shortness of breath.  He does complain 

of mild chest tightness which he attributes to his COPD.  He denies any history 

of coronary artery disease, does have a remote history of possible DVT in the 

right leg with filter placement.  He is not currently on any anticoagulation.  

Symptoms are moderate.  He denies any new fever, chills, or sweats.  The 

patient did recently find a new primary physician, but does not have an 

appointment until next month with [facility].





PFSH


Past Medical History


Hx Anticoagulant Therapy:  Yes


Asthma:  No


Anxiety:  Yes


Heart Rhythm Problems:  No


Cancer:  No


Cardiovascular Problems:  Yes


High Cholesterol:  No


Chest Pain:  No


Congestive Heart Failure:  No


COPD:  Yes


Coronary Artery Disease:  No


Diabetes:  No


Diminished Hearing:  No


Diverticulitis:  Yes


Deep Vein Thrombosis:  Yes (PE-FILTER UPPER LEG )


Endocrine:  No


Gastrointestinal Disorders:  Yes (COLOSTOMY JULY 2016, DIVERTICULITIS, SEPSIS)


GERD:  Yes


Genitourinary:  No


Hiatal Hernia:  No


Hypertension:  No


Immune Disorder:  No


Implanted Vascular Access Dvce:  Yes (FILTER)


Musculoskeletal:  No


Neurologic:  No


Psychiatric:  No


Reproductive:  No


Respiratory:  Yes (copd)


Immunizations Current:  Yes


Sleep Apnea:  No


Ulcer:  No





Past Surgical History


Abdominal Surgery:  Yes (diverticulitis, colostomy and reversible, appendectomy 

hernia)


AICD:  No


Appendectomy:  Yes


Arteriovenous Shunt:  No


Body Medical Devices:  greenfiled filter


Cardiac Surgery:  No


Ear Surgery:  No


Endocrine Surgery:  No


Eye Surgery:  No


Genitourinary Surgery:  No


Gynecologic Surgery:  No


Insulin Pump:  No


Joint Replacement:  No


Neurologic Surgery:  No


Oral Surgery:  No


Pacemaker:  No


Thoracic Surgery:  No


Other Surgery:  Yes (colostomy reversal)





Social History


Alcohol Use:  Yes (OCCASIONAL ON WEEKEND)


Tobacco Use:  Yes (1 pk)


Substance Use:  No





Allergies-Medications


(Allergen,Severity, Reaction):  


Coded Allergies:  


     No Known Allergies (Unverified  Allergy, Unknown, 5/21/18)


Reported Meds & Prescriptions





Reported Meds & Active Scripts


Active


Albuterol Neb (Albuterol Sulfate) 2.5 Mg/3 Ml Neb 2.5 Mg NEB Q4HR NEB


     While awake


Ipratropium Neb (Ipratropium Bromide) 0.5 Mg/2.5 Ml Amp 0.5 Mg NEB Q2HR NEB PRN 

30 Days


Breo Ellipta Inh (Fluticasone/Vilanterol) 100-25 Mcg/Act Inh 1 Puff INH DAILY 

30 Days


     Use daily at the same time.


Reported


Ventolin Hfa 18 GM Inh (Albuterol Sulfate) 90 Mcg/Act Aer 1 Puff INH Q4H PRN








Review of Systems


Except as stated in HPI:  all other systems reviewed are Neg


General / Constitutional:  No: Fever


Cardiovascular:  Positive: Chest Pain or Discomfort (Tightness)


Respiratory:  Positive: Cough, Shortness of Breath, Wheezing


Gastrointestinal:  No: Nausea, Vomiting, Abdominal Pain


Musculoskeletal:  No: Edema


Neurologic:  No: Dizziness





Physical Exam


Narrative


GENERAL: Awake, alert, pleasant 47-year-old male who appears his stated age and 

is in mild respiratory distress


SKIN: Focused skin assessment warm/dry.


HEAD: Atraumatic. Normocephalic. 


EYES: Pupils equal and round. No scleral icterus. No injection or drainage. 


ENT: No nasal bleeding or discharge.  Mucous membranes pink and moist.


NECK: Trachea midline. No JVD. 


CARDIOVASCULAR: Regular, tachycardic with a heart rate in the 120s.


RESPIRATORY: Mild tachypnea with a respiratory rate of 24.  Diminished breath 

sounds with tight expiratory wheezing noted.


GASTROINTESTINAL: Abdomen soft, non-tender, nondistended.  Well-healed midline 

abdominal scar.


MUSCULOSKELETAL: No obvious deformities. No clubbing.  No cyanosis.  No edema. 


NEUROLOGICAL: Awake and alert. No obvious cranial nerve deficits.  Motor 

grossly within normal limits. Normal speech.


PSYCHIATRIC: Appropriate mood and affect; insight and judgment normal.





Data


Data


Last Documented VS





Vital Signs








  Date Time  Temp Pulse Resp B/P (MAP) Pulse Ox O2 Delivery O2 Flow Rate FiO2


 


6/20/18 19:05   23  95 Nasal Cannula 4.00 


 


6/20/18 18:25 97.8 132  116/94 (101)    








Orders





 Orders


Complete Blood Count With Diff (6/20/18 18:24)


Comprehensive Metabolic Panel (6/20/18 18:24)


B-Type Natriuretic Peptide (6/20/18 18:24)


Magnesium (Mg) (6/20/18 18:24)


Ckmb (Isoenzyme) Profile (6/20/18 18:24)


Troponin I (6/20/18 18:24)


Iv Access Insert/Monitor (6/20/18 18:24)


Ecg Monitoring (6/20/18 18:24)


Oximetry (6/20/18 18:24)


Oxygen Administration (6/20/18 18:24)


Chest, Single Ap (6/20/18 18:24)


Sodium Chloride 0.9% Flush (Ns Flush) (6/20/18 18:30)


Methylprednisolone So Succ Inj (Solumedr (6/20/18 18:30)


Albuterol-Ipratropium Neb (Duoneb Neb) (6/20/18 18:30)


Sodium Chlorid 0.9% 500 Ml Inj (Ns 500 M (6/20/18 18:30)





Labs





Laboratory Tests








Test


  6/20/18


18:50


 


White Blood Count 12.5 TH/MM3 


 


Red Blood Count 5.43 MIL/MM3 


 


Hemoglobin 16.1 GM/DL 


 


Hematocrit 47.7 % 


 


Mean Corpuscular Volume 87.9 FL 


 


Mean Corpuscular Hemoglobin 29.6 PG 


 


Mean Corpuscular Hemoglobin


Concent 33.6 % 


 


 


Red Cell Distribution Width 13.5 % 


 


Platelet Count 426 TH/MM3 


 


Mean Platelet Volume 7.4 FL 


 


Neutrophils (%) (Auto) 64.3 % 


 


Lymphocytes (%) (Auto) 21.3 % 


 


Monocytes (%) (Auto) 7.5 % 


 


Eosinophils (%) (Auto) 6.3 % 


 


Basophils (%) (Auto) 0.6 % 


 


Neutrophils # (Auto) 8.0 TH/MM3 


 


Lymphocytes # (Auto) 2.7 TH/MM3 


 


Monocytes # (Auto) 0.9 TH/MM3 


 


Eosinophils # (Auto) 0.8 TH/MM3 


 


Basophils # (Auto) 0.1 TH/MM3 


 


CBC Comment DIFF FINAL 


 


Differential Comment  


 


Blood Urea Nitrogen 21 MG/DL 


 


Creatinine 0.83 MG/DL 


 


Random Glucose 147 MG/DL 


 


Total Protein 7.3 GM/DL 


 


Albumin 3.5 GM/DL 


 


Calcium Level 8.6 MG/DL 


 


Magnesium Level 2.1 MG/DL 


 


Alkaline Phosphatase 69 U/L 


 


Aspartate Amino Transf


(AST/SGOT) 56 U/L 


 


 


Alanine Aminotransferase


(ALT/SGPT) 143 U/L 


 


 


Total Bilirubin 0.2 MG/DL 


 


Sodium Level 140 MEQ/L 


 


Potassium Level 3.7 MEQ/L 


 


Chloride Level 108 MEQ/L 


 


Carbon Dioxide Level 23.8 MEQ/L 


 


Anion Gap 8 MEQ/L 


 


Estimat Glomerular Filtration


Rate 98 ML/MIN 


 


 


Total Creatine Kinase 99 U/L 


 


Troponin I


  LESS THAN 0.02


NG/ML


 


B-Type Natriuretic Peptide 20 PG/ML 











MDM


Medical Decision Making


Medical Screen Exam Complete:  Yes


Emergency Medical Condition:  Yes


Medical Record Reviewed:  Yes


Interpretation(s)


EKG reveals sinus tachycardia with a heart rate of 125.  No ischemic changes 

noted.








Last Impressions








Chest X-Ray 6/20/18 1824 Signed





Impressions: 





 CONCLUSION: 





 Diminished lung volumes without infiltrate.





  





 








Laboratory Tests








Test


  6/20/18


18:50


 


White Blood Count 12.5 TH/MM3 


 


Red Blood Count 5.43 MIL/MM3 


 


Hemoglobin 16.1 GM/DL 


 


Hematocrit 47.7 % 


 


Mean Corpuscular Volume 87.9 FL 


 


Mean Corpuscular Hemoglobin 29.6 PG 


 


Mean Corpuscular Hemoglobin


Concent 33.6 % 


 


 


Red Cell Distribution Width 13.5 % 


 


Platelet Count 426 TH/MM3 


 


Mean Platelet Volume 7.4 FL 


 


Neutrophils (%) (Auto) 64.3 % 


 


Lymphocytes (%) (Auto) 21.3 % 


 


Monocytes (%) (Auto) 7.5 % 


 


Eosinophils (%) (Auto) 6.3 % 


 


Basophils (%) (Auto) 0.6 % 


 


Neutrophils # (Auto) 8.0 TH/MM3 


 


Lymphocytes # (Auto) 2.7 TH/MM3 


 


Monocytes # (Auto) 0.9 TH/MM3 


 


Eosinophils # (Auto) 0.8 TH/MM3 


 


Basophils # (Auto) 0.1 TH/MM3 


 


CBC Comment DIFF FINAL 


 


Differential Comment  


 


Blood Urea Nitrogen 21 MG/DL 


 


Creatinine 0.83 MG/DL 


 


Random Glucose 147 MG/DL 


 


Total Protein 7.3 GM/DL 


 


Albumin 3.5 GM/DL 


 


Calcium Level 8.6 MG/DL 


 


Magnesium Level 2.1 MG/DL 


 


Alkaline Phosphatase 69 U/L 


 


Aspartate Amino Transf


(AST/SGOT) 56 U/L 


 


 


Alanine Aminotransferase


(ALT/SGPT) 143 U/L 


 


 


Total Bilirubin 0.2 MG/DL 


 


Sodium Level 140 MEQ/L 


 


Potassium Level 3.7 MEQ/L 


 


Chloride Level 108 MEQ/L 


 


Carbon Dioxide Level 23.8 MEQ/L 


 


Anion Gap 8 MEQ/L 


 


Estimat Glomerular Filtration


Rate 98 ML/MIN 


 


 


Total Creatine Kinase 99 U/L 


 


Troponin I


  LESS THAN 0.02


NG/ML


 


B-Type Natriuretic Peptide 20 PG/ML 








Differential Diagnosis


Differential diagnosis includes COPD exacerbation, bronchitis, pneumonia, 

pneumothorax, acute coronary syndrome, congestive heart failure, pleural 

effusion, pulmonary edema, pulmonary embolism.


Narrative Course


IV was established, labs are drawn and sent, and the patient was placed on 

cardiac telemetry monitoring and continuous pulse oximetry monitoring.  EKG was 

ordered and interpreted.  Chest x-ray was obtained.  The patient received Solu-

Medrol 125 mg intravenously and duo nebs 3.  BNP and troponin/CPK were sent to 

lab.  Chest x-ray reveals diminished lung volumes, otherwise unremarkable.  BNP 

is negative.  Troponin and CPK are unremarkable.  The patient was reassessed at 

7:32 PM.  The patient states his symptoms have significantly improved.  Heart 

rate was down to 105.  He was breathing comfortably.  I did advise him to 

return if symptoms worsen or progress.





Diagnosis





 Primary Impression:  


 COPD with acute exacerbation


Patient Instructions:  General Instructions





***Additional Instructions:  


Medications as directed.  Follow-up with your primary physician.  Please 

provide the patient a copy of his x-ray results and lab results at discharge.  

Return if symptoms worsen or progress.


***Med/Other Pt SpecificInfo:  Prescription(s) given


Scripts


Azithromycin (Zithromax Z-Jimmy) 250 Mg Dspk


250 MG PO AS DIRECTED for Infection, #1 DSPK 0 Refills


   500 MG (2 tabs) day 1, then 1 tab days 2-5.


   Prov: Davonte Roche MD         6/20/18 


Albuterol Neb (Albuterol Neb) 2.5 Mg/3 Ml Neb


2.5 MG NEB Q4HR NEB Y for SHORTNESS OF BREATH, #60 NEBULE 0 Refills


   Prov: Davonte Roche MD         6/20/18 


Prednisone (Prednisone) 20 Mg Tab


40 MG PO DAILY, #10 TAB 0 Refills


   Take 40 mg (2 tablets) daily for 5 days


   Prov: Davonte Roche MD         6/20/18


Disposition:  01 DISCHARGE HOME


Condition:  Stable











Davonte Roche MD Jun 20, 2018 18:29

## 2018-06-21 NOTE — EKG
Date Performed: 2018       Time Performed: 18:21:30

 

PTAGE:      47 years

 

EKG:      SINUS TACHYCARDIA ABNORMAL RHYTHM ECG Since the 

 

 PREVIOUS TRACING            , no significant change noted PREVIOUS TRACIN2018 10.50

 

DOCTOR:   Gladis Gonzales  Interpretating Date/Time  2018 18:42:09